# Patient Record
Sex: FEMALE | Race: WHITE | Employment: FULL TIME | URBAN - METROPOLITAN AREA
[De-identification: names, ages, dates, MRNs, and addresses within clinical notes are randomized per-mention and may not be internally consistent; named-entity substitution may affect disease eponyms.]

---

## 2021-06-24 LAB
CHOLESTEROL, TOTAL: 155 MG/DL
CHOLESTEROL/HDL RATIO: NORMAL
HDLC SERPL-MCNC: 67 MG/DL (ref 35–70)
LDL CHOLESTEROL CALCULATED: 70 MG/DL (ref 0–160)
NONHDLC SERPL-MCNC: NORMAL MG/DL
TRIGL SERPL-MCNC: 92 MG/DL
VLDLC SERPL CALC-MCNC: 18 MG/DL

## 2021-10-07 ENCOUNTER — APPOINTMENT (OUTPATIENT)
Dept: CT IMAGING | Age: 46
End: 2021-10-07
Payer: COMMERCIAL

## 2021-10-07 ENCOUNTER — HOSPITAL ENCOUNTER (EMERGENCY)
Age: 46
Discharge: HOME | End: 2021-10-07
Payer: COMMERCIAL

## 2021-10-07 ENCOUNTER — APPOINTMENT (OUTPATIENT)
Dept: GENERAL RADIOLOGY | Age: 46
End: 2021-10-07
Payer: COMMERCIAL

## 2021-10-07 ENCOUNTER — NURSE TRIAGE (OUTPATIENT)
Dept: OTHER | Facility: CLINIC | Age: 46
End: 2021-10-07

## 2021-10-07 VITALS
HEART RATE: 64 BPM | TEMPERATURE: 98 F | OXYGEN SATURATION: 96 % | HEIGHT: 63 IN | RESPIRATION RATE: 17 BRPM | BODY MASS INDEX: 37.74 KG/M2 | DIASTOLIC BLOOD PRESSURE: 81 MMHG | WEIGHT: 213 LBS | SYSTOLIC BLOOD PRESSURE: 132 MMHG

## 2021-10-07 DIAGNOSIS — S09.90XA CLOSED HEAD INJURY, INITIAL ENCOUNTER: ICD-10-CM

## 2021-10-07 DIAGNOSIS — M25.552 LEFT HIP PAIN: ICD-10-CM

## 2021-10-07 DIAGNOSIS — M25.512 ACUTE PAIN OF LEFT SHOULDER: ICD-10-CM
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
## 2021-10-07 PROCEDURE — 73502 X-RAY EXAM HIP UNI 2-3 VIEWS: CPT

## 2021-10-07 PROCEDURE — 73030 X-RAY EXAM OF SHOULDER: CPT

## 2021-10-07 PROCEDURE — 96372 THER/PROPH/DIAG INJ SC/IM: CPT

## 2021-10-07 PROCEDURE — 72125 CT NECK SPINE W/O DYE: CPT

## 2021-10-07 PROCEDURE — 70450 CT HEAD/BRAIN W/O DYE: CPT

## 2021-10-07 PROCEDURE — 6360000002 HC RX W HCPCS: Performed by: PHYSICIAN ASSISTANT

## 2021-10-07 PROCEDURE — 99282 EMERGENCY DEPT VISIT SF MDM: CPT

## 2021-10-07 RX ORDER — LISINOPRIL 20 MG/1
20 TABLET ORAL DAILY
COMMUNITY
End: 2022-03-09 | Stop reason: ALTCHOICE

## 2021-10-07 RX ORDER — MELOXICAM 7.5 MG/1
15 TABLET ORAL DAILY
Qty: 20 TABLET | Refills: 0 | Status: SHIPPED | OUTPATIENT
Start: 2021-10-07 | End: 2022-01-05 | Stop reason: ALTCHOICE

## 2021-10-07 RX ORDER — KETOROLAC TROMETHAMINE 30 MG/ML
15 INJECTION, SOLUTION INTRAMUSCULAR; INTRAVENOUS ONCE
Status: COMPLETED | OUTPATIENT
Start: 2021-10-07 | End: 2021-10-07

## 2021-10-07 RX ORDER — MELOXICAM 7.5 MG/1
15 TABLET ORAL DAILY
Qty: 20 TABLET | Refills: 0 | Status: SHIPPED | OUTPATIENT
Start: 2021-10-07 | End: 2021-10-07 | Stop reason: SDUPTHER

## 2021-10-07 RX ORDER — PREGABALIN 150 MG/1
300 CAPSULE ORAL 2 TIMES DAILY
COMMUNITY
End: 2022-05-18

## 2021-10-07 RX ORDER — METHOCARBAMOL 500 MG/1
500 TABLET, FILM COATED ORAL 4 TIMES DAILY
Qty: 40 TABLET | Refills: 0 | Status: SHIPPED | OUTPATIENT
Start: 2021-10-07 | End: 2021-10-17

## 2021-10-07 RX ORDER — SIMVASTATIN 20 MG
20 TABLET ORAL NIGHTLY
COMMUNITY
End: 2022-04-25 | Stop reason: SDUPTHER

## 2021-10-07 RX ORDER — OMEPRAZOLE 20 MG/1
40 CAPSULE, DELAYED RELEASE ORAL NIGHTLY
COMMUNITY
End: 2022-03-04 | Stop reason: SDUPTHER

## 2021-10-07 RX ORDER — TOPIRAMATE 100 MG/1
100 TABLET, FILM COATED ORAL DAILY
COMMUNITY
End: 2022-03-04

## 2021-10-07 RX ORDER — SERTRALINE HYDROCHLORIDE 100 MG/1
150 TABLET, FILM COATED ORAL DAILY
COMMUNITY
End: 2022-04-25 | Stop reason: SDUPTHER

## 2021-10-07 RX ORDER — LEVOTHYROXINE SODIUM 0.15 MG/1
150 TABLET ORAL DAILY
COMMUNITY
End: 2022-04-25 | Stop reason: SDUPTHER

## 2021-10-07 RX ORDER — ASPIRIN 81 MG/1
81 TABLET ORAL DAILY
COMMUNITY

## 2021-10-07 RX ORDER — FAMOTIDINE 20 MG/1
20 TABLET, FILM COATED ORAL DAILY
COMMUNITY
End: 2022-03-04 | Stop reason: SDUPTHER

## 2021-10-07 RX ORDER — METHOCARBAMOL 500 MG/1
500 TABLET, FILM COATED ORAL 4 TIMES DAILY
Qty: 40 TABLET | Refills: 0 | Status: SHIPPED | OUTPATIENT
Start: 2021-10-07 | End: 2021-10-07 | Stop reason: SDUPTHER

## 2021-10-07 RX ORDER — PROMETHAZINE HYDROCHLORIDE 25 MG/1
25 TABLET ORAL EVERY 6 HOURS PRN
COMMUNITY
End: 2022-03-04 | Stop reason: SDUPTHER

## 2021-10-07 RX ADMIN — KETOROLAC TROMETHAMINE 15 MG: 30 INJECTION, SOLUTION INTRAMUSCULAR; INTRAVENOUS at 18:53

## 2021-10-07 ASSESSMENT — PAIN DESCRIPTION - PAIN TYPE: TYPE: ACUTE PAIN

## 2021-10-07 ASSESSMENT — PAIN DESCRIPTION - ORIENTATION: ORIENTATION: LEFT

## 2021-10-07 ASSESSMENT — PAIN SCALES - GENERAL
PAINLEVEL_OUTOF10: 6
PAINLEVEL_OUTOF10: 6

## 2021-10-07 ASSESSMENT — PAIN DESCRIPTION - LOCATION: LOCATION: HIP;SHOULDER

## 2021-10-07 NOTE — TELEPHONE ENCOUNTER
Patient's location of employment: [employer] - happened in courtyard  Location of injury: Back of head, left shoulder, and left hip  Time of injury: 10/7/21 11:30am   Last 4 of patient's SSN: [id number]  Location recommended for treatment: Patient has been seen in the ED already      Patient took lunch outside to eat and when she sat down at the metal table, the attached chair was removed and entire table flipped on top of her and pinned her underneath. Hit back of head, left shoulder and left hip. Current pain level is 5/10   No open skin or lumps on head. Bruising on both shoulder and hip     Associate went to ED for eval and treatment. Limit activity and was put off work until 10/18/21     Reason for Disposition   Caller has already spoken with another triager and has no further questions.     Protocols used: NO CONTACT OR DUPLICATE CONTACT [facility]

## 2021-10-07 NOTE — DISCHARGE INSTR - COC
Elimination:  Continence:   · Bowel: {YES / YR:65497}  · Bladder: {YES / BJ:46390}  Urinary Catheter: {Urinary Catheter:295648824}   Colostomy/Ileostomy/Ileal Conduit: {YES / DI:87937}       Date of Last BM: ***  No intake or output data in the 24 hours ending 10/07/21 1843  No intake/output data recorded.     Safety Concerns:     [facility] Safety Concerns:623708542}    Impairments/Disabilities:      [facility] Impairments/Disabilities:517950067}    Nutrition Therapy:  Current Nutrition Therapy:   [facility] Diet List:056690675}    Routes of Feeding: {CHP DME Other Feedings:969722649}  Liquids: {Slp liquid thickness:72414}  Daily Fluid Restriction: {CHP DME Yes amt example:932401048}  Last Modified Barium Swallow with Video (Video Swallowing Test): {Done Not Done VCIO:091961149}    Treatments at the Time of Hospital Discharge:   Respiratory Treatments: ***  Oxygen Therapy:  {Therapy; copd oxygen:71128}  Ventilator:    {[facility] Vent ZYNR:567116059}    Rehab Therapies: {THERAPEUTIC INTERVENTION:6436267406}  Weight Bearing Status/Restrictions: [facility] Weight Bearin}  Other Medical Equipment (for information only, NOT a DME order):  {EQUIPMENT:297048084}  Other Treatments: ***    Patient's personal belongings (please select all that are sent with patient):  {[facility] DME Belongings:758740822}    RN SIGNATURE:  {Esignature:666369585}    CASE MANAGEMENT/SOCIAL WORK SECTION    Inpatient Status Date: ***    Readmission Risk Assessment Score:  Readmission Risk              Risk of Unplanned Readmission:  0           Discharging to Facility/ Agency   · Name:   · Address:  · Phone:  · Fax:    Dialysis Facility (if applicable)   · Name:  · Address:  · Dialysis Schedule:  · Phone:  · Fax:    / signature: {Esignature:542116091}    PHYSICIAN SECTION    Prognosis: {Prognosis:9388762884}    Condition at Discharge: [facility] Patient Condition:033819663}    Rehab Potential (if transferring to Rehab): {Prognosis:4850510829}    Recommended Labs or Other Treatments After Discharge: ***    Physician Certification: I certify the above information and transfer of Yvon Krause  is necessary for the continuing treatment of the diagnosis listed and that she requires {Admit to Appropriate Level of Care:80998} for {GREATER/LESS:738798660} 30 days.      Update Admission H&P: {[facility]:744334218}    PHYSICIAN SIGNATURE:  {Esignature:175760834}

## 2021-10-07 NOTE — ED NOTES
Discharge instructions given. Pt verbalized understanding. Pt ambulated to waiting room.         Leopold Mettle, RN  10/07/21 8437

## 2021-10-07 NOTE — ED TRIAGE NOTES
Pt arrived to the Ed with complaints of left hip pain and left shoulder pain after a cafeteria table fell on top of her. Pt states she also hit her head. NO LOC.

## 2021-10-07 NOTE — Clinical Note
Carie Oneal was seen and treated in our emergency department on 10/7/2021. She may return to work on 10/18/2021. May return back to work without restriction. If symptoms linger will need orthopedic/occupational health for long-term management and restrictions. If you have any questions or concerns, please don't hesitate to call.       Sherri Delong 411, PA

## 2021-11-08 LAB
AVERAGE GLUCOSE: NORMAL
HBA1C MFR BLD: 7.7 %

## 2021-12-27 ENCOUNTER — HOSPITAL ENCOUNTER (INPATIENT)
Age: 46
LOS: 1 days | Discharge: HOME | DRG: 660 | End: 2021-12-30
Attending: EMERGENCY MEDICINE | Admitting: INTERNAL MEDICINE
Payer: COMMERCIAL

## 2021-12-27 ENCOUNTER — APPOINTMENT (OUTPATIENT)
Dept: CT IMAGING | Age: 46
DRG: 660 | End: 2021-12-27
Payer: COMMERCIAL

## 2021-12-27 DIAGNOSIS — N20.0 RENAL CALCULI: ICD-10-CM

## 2021-12-27 DIAGNOSIS — N10 ACUTE PYELONEPHRITIS: ICD-10-CM

## 2021-12-27 DIAGNOSIS — N20.1 URETEROLITHIASIS: Primary | ICD-10-CM

## 2021-12-27 PROBLEM — N12 PYELONEPHRITIS: Status: ACTIVE | Noted: 2021-12-27

## 2021-12-27 LAB
ANION GAP SERPL CALCULATED.3IONS-SCNC: 14 MMOL/L (ref 4–16)
BACTERIA: ABNORMAL /HPF
BASOPHILS ABSOLUTE: 0.1 K/CU MM
BASOPHILS RELATIVE PERCENT: 1.3 % (ref 0–1)
BILIRUBIN URINE: NEGATIVE MG/DL
BLOOD, URINE: NEGATIVE
BUN BLDV-MCNC: 10 MG/DL (ref 6–23)
CALCIUM SERPL-MCNC: 9 MG/DL (ref 8.3–10.6)
CAST TYPE: ABNORMAL /HPF
CHLORIDE BLD-SCNC: 108 MMOL/L (ref 99–110)
CLARITY: ABNORMAL
CO2: 21 MMOL/L (ref 21–32)
COLOR: ABNORMAL
COMMENT UA: ABNORMAL
CREAT SERPL-MCNC: 0.6 MG/DL (ref 0.6–1.1)
CRYSTAL TYPE: ABNORMAL /HPF
DIFFERENTIAL TYPE: ABNORMAL
EOSINOPHILS ABSOLUTE: 0.1 K/CU MM
EOSINOPHILS RELATIVE PERCENT: 1.3 % (ref 0–3)
EPITHELIAL CELLS, UA: 4 /HPF
GFR AFRICAN AMERICAN: >60 ML/MIN/1.73M2
GFR NON-AFRICAN AMERICAN: >60 ML/MIN/1.73M2
GLUCOSE BLD-MCNC: 121 MG/DL (ref 70–99)
GLUCOSE, URINE: NEGATIVE MG/DL
HCT VFR BLD CALC: 39.7 % (ref 37–47)
HEMOGLOBIN: 12.6 GM/DL (ref 12.5–16)
IMMATURE NEUTROPHIL %: 0.3 % (ref 0–0.43)
KETONES, URINE: ABNORMAL MG/DL
LEUKOCYTE ESTERASE, URINE: ABNORMAL
LYMPHOCYTES ABSOLUTE: 1.9 K/CU MM
LYMPHOCYTES RELATIVE PERCENT: 26.9 % (ref 24–44)
MCH RBC QN AUTO: 29.6 PG (ref 27–31)
MCHC RBC AUTO-ENTMCNC: 31.7 % (ref 32–36)
MCV RBC AUTO: 93.2 FL (ref 78–100)
MONOCYTES ABSOLUTE: 0.5 K/CU MM
MONOCYTES RELATIVE PERCENT: 7.2 % (ref 0–4)
NITRITE URINE, QUANTITATIVE: NEGATIVE
PDW BLD-RTO: 12.9 % (ref 11.7–14.9)
PH, URINE: 5 (ref 5–8)
PLATELET # BLD: 286 K/CU MM (ref 140–440)
PMV BLD AUTO: 12.2 FL (ref 7.5–11.1)
POTASSIUM SERPL-SCNC: 3.6 MMOL/L (ref 3.5–5.1)
PROTEIN UA: 100 MG/DL
RBC # BLD: 4.26 M/CU MM (ref 4.2–5.4)
RBC URINE: 1 /HPF (ref 0–6)
SARS-COV-2, NAAT: NOT DETECTED
SEGMENTED NEUTROPHILS ABSOLUTE COUNT: 4.4 K/CU MM
SEGMENTED NEUTROPHILS RELATIVE PERCENT: 63 % (ref 36–66)
SODIUM BLD-SCNC: 143 MMOL/L (ref 135–145)
SOURCE: NORMAL
SPECIFIC GRAVITY UA: 1.01 (ref 1–1.03)
TOTAL IMMATURE NEUTOROPHIL: 0.02 K/CU MM
UROBILINOGEN, URINE: 1 MG/DL (ref 0.2–1)
WBC # BLD: 7 K/CU MM (ref 4–10.5)
WBC UA: 30 /HPF (ref 0–5)

## 2021-12-27 PROCEDURE — 96376 TX/PRO/DX INJ SAME DRUG ADON: CPT

## 2021-12-27 PROCEDURE — 96361 HYDRATE IV INFUSION ADD-ON: CPT

## 2021-12-27 PROCEDURE — 99284 EMERGENCY DEPT VISIT MOD MDM: CPT

## 2021-12-27 PROCEDURE — 80048 BASIC METABOLIC PNL TOTAL CA: CPT

## 2021-12-27 PROCEDURE — 96365 THER/PROPH/DIAG IV INF INIT: CPT

## 2021-12-27 PROCEDURE — 6360000002 HC RX W HCPCS: Performed by: EMERGENCY MEDICINE

## 2021-12-27 PROCEDURE — 6370000000 HC RX 637 (ALT 250 FOR IP): Performed by: EMERGENCY MEDICINE

## 2021-12-27 PROCEDURE — 74176 CT ABD & PELVIS W/O CONTRAST: CPT

## 2021-12-27 PROCEDURE — 2580000003 HC RX 258: Performed by: EMERGENCY MEDICINE

## 2021-12-27 PROCEDURE — 85025 COMPLETE CBC W/AUTO DIFF WBC: CPT

## 2021-12-27 PROCEDURE — 81001 URINALYSIS AUTO W/SCOPE: CPT

## 2021-12-27 PROCEDURE — 87086 URINE CULTURE/COLONY COUNT: CPT

## 2021-12-27 PROCEDURE — 96375 TX/PRO/DX INJ NEW DRUG ADDON: CPT

## 2021-12-27 PROCEDURE — 87635 SARS-COV-2 COVID-19 AMP PRB: CPT

## 2021-12-27 PROCEDURE — 96372 THER/PROPH/DIAG INJ SC/IM: CPT

## 2021-12-27 RX ORDER — MORPHINE SULFATE 4 MG/ML
4 INJECTION, SOLUTION INTRAMUSCULAR; INTRAVENOUS ONCE
Status: COMPLETED | OUTPATIENT
Start: 2021-12-27 | End: 2021-12-27

## 2021-12-27 RX ORDER — SODIUM CHLORIDE 9 MG/ML
INJECTION, SOLUTION INTRAVENOUS CONTINUOUS
Status: DISCONTINUED | OUTPATIENT
Start: 2021-12-27 | End: 2021-12-28

## 2021-12-27 RX ORDER — 0.9 % SODIUM CHLORIDE 0.9 %
1000 INTRAVENOUS SOLUTION INTRAVENOUS ONCE
Status: COMPLETED | OUTPATIENT
Start: 2021-12-27 | End: 2021-12-27

## 2021-12-27 RX ORDER — ONDANSETRON 2 MG/ML
4 INJECTION INTRAMUSCULAR; INTRAVENOUS EVERY 6 HOURS PRN
Status: DISCONTINUED | OUTPATIENT
Start: 2021-12-27 | End: 2021-12-28 | Stop reason: SDUPTHER

## 2021-12-27 RX ORDER — MORPHINE SULFATE 4 MG/ML
4 INJECTION, SOLUTION INTRAMUSCULAR; INTRAVENOUS
Status: DISCONTINUED | OUTPATIENT
Start: 2021-12-27 | End: 2021-12-28

## 2021-12-27 RX ORDER — ONDANSETRON 4 MG/1
8 TABLET, ORALLY DISINTEGRATING ORAL ONCE
Status: COMPLETED | OUTPATIENT
Start: 2021-12-27 | End: 2021-12-27

## 2021-12-27 RX ORDER — KETOROLAC TROMETHAMINE 30 MG/ML
30 INJECTION, SOLUTION INTRAMUSCULAR; INTRAVENOUS ONCE
Status: COMPLETED | OUTPATIENT
Start: 2021-12-27 | End: 2021-12-27

## 2021-12-27 RX ADMIN — KETOROLAC TROMETHAMINE 30 MG: 30 INJECTION, SOLUTION INTRAMUSCULAR; INTRAVENOUS at 15:31

## 2021-12-27 RX ADMIN — CEFTRIAXONE SODIUM 1000 MG: 1 INJECTION, POWDER, FOR SOLUTION INTRAMUSCULAR; INTRAVENOUS at 17:11

## 2021-12-27 RX ADMIN — ONDANSETRON 4 MG: 2 INJECTION INTRAMUSCULAR; INTRAVENOUS at 20:50

## 2021-12-27 RX ADMIN — ONDANSETRON 8 MG: 4 TABLET, ORALLY DISINTEGRATING ORAL at 15:32

## 2021-12-27 RX ADMIN — MORPHINE SULFATE 4 MG: 4 INJECTION INTRAVENOUS at 20:50

## 2021-12-27 RX ADMIN — MORPHINE SULFATE 4 MG: 4 INJECTION INTRAVENOUS at 17:29

## 2021-12-27 RX ADMIN — SODIUM CHLORIDE 1000 ML: 9 INJECTION, SOLUTION INTRAVENOUS at 17:07

## 2021-12-27 RX ADMIN — SODIUM CHLORIDE: 9 INJECTION, SOLUTION INTRAVENOUS at 20:50

## 2021-12-27 ASSESSMENT — PAIN DESCRIPTION - LOCATION: LOCATION: BACK;FLANK;PELVIS

## 2021-12-27 ASSESSMENT — PAIN DESCRIPTION - FREQUENCY: FREQUENCY: CONTINUOUS

## 2021-12-27 ASSESSMENT — PAIN SCALES - GENERAL
PAINLEVEL_OUTOF10: 9
PAINLEVEL_OUTOF10: 6
PAINLEVEL_OUTOF10: 7

## 2021-12-27 ASSESSMENT — PAIN DESCRIPTION - ONSET: ONSET: PROGRESSIVE

## 2021-12-27 ASSESSMENT — PAIN DESCRIPTION - ORIENTATION: ORIENTATION: RIGHT;LEFT;LOWER

## 2021-12-27 ASSESSMENT — PAIN DESCRIPTION - DESCRIPTORS: DESCRIPTORS: BURNING;ACHING

## 2021-12-27 ASSESSMENT — PAIN DESCRIPTION - PAIN TYPE: TYPE: ACUTE PAIN

## 2021-12-27 NOTE — LETTER
[facility] [address]  [address] [address]  Phone: [phone]    No name on file. December 30, 2021     Patient: Severa Bonito   YOB: 1975   Date of Visit: 12/27/2021       To Whom It May Concern: It is my medical opinion that Severa Bonito may return to work 1/4/2022. If you have any questions or concerns, please don't hesitate to call.     Sincerely,        Peyman Remy RN

## 2021-12-27 NOTE — ED TRIAGE NOTES
Pt to the ED for c/o bilateral flank pain and lower back pain with dysuria and hematuria x2 days. Pt reports hx of kidney stones.

## 2021-12-27 NOTE — ED PROVIDER NOTES
Triage Chief Complaint:   Flank Pain (Pt to the ED for c/o bilateral flank pain with dysuria and hematuria x2 days. Pt reports hx of kidney stones. ) and Dysuria    [place]:  Cynthia Santiago is a 48 y.o. female that presents with concern for kidney stone/urinary tract infection. The patient states that over the past 2 days she has had dysuria, increasing frequency and some blood in her urine. States that she intermittently has sharp bilateral flank pain but fairly constant cramping suprapubic and bilateral abdominal pain. Denies any alleviating or exacerbating factors. She has been taking ibuprofen and Pyridium. States that she thinks she may have had a fever yesterday. She has associated nausea. Denies any vomiting or diarrhea. States that she has a history of kidney stones and that this feels similar. ROS:  At least 10 systems reviewed and otherwise acutely negative except as in the [address] Past Medical History:   Diagnosis Date    Depression     Diabetes mellitus ([address]     GERD (gastroesophageal reflux disease)     Hypertension     Thyroid disease      Past Surgical History:   Procedure Laterality Date    APPENDECTOMY  1989    CHOLECYSTECTOMY  1995    GASTRIC BYPASS SURGERY  2014    HYSTERECTOMY, TOTAL ABDOMINAL  2017    KIDNEY STONE SURGERY  2017    SPINAL CORD STIMULATOR SURGERY  2017    TONSILLECTOMY  1997     History reviewed. No pertinent family history.   Social History     Socioeconomic History    Marital status: Single     Spouse name: Not on file    Number of children: Not on file    Years of education: Not on file    Highest education level: Not on file   Occupational History    Not on file   Tobacco Use    Smoking status: Never Smoker    Smokeless tobacco: Never Used   Vaping Use    Vaping Use: Never used   Substance and Sexual Activity    Alcohol use: Not Currently     Comment: occ    Drug use: Never    Sexual activity: Not on file   Other Topics Concern    Not on file Social History Narrative    Not on file     Social Determinants of Health     Financial Resource Strain:     Difficulty of Paying Living Expenses: Not on file   Food Insecurity:     Worried About Running Out of Food in the Last Year: Not on file    Nena of Food in the Last Year: Not on file   Transportation Needs:     Lack of Transportation (Medical): Not on file    Lack of Transportation (Non-Medical):  Not on file   Physical Activity:     Days of Exercise per Week: Not on file    Minutes of Exercise per Session: Not on file   Stress:     Feeling of Stress : Not on file   Social Connections:     Frequency of Communication with Friends and Family: Not on file    Frequency of Social Gatherings with Friends and Family: Not on file    Attends Hindu Services: Not on file    Active Member of [address] [place] [address] SMB [employer] or Organizations: Not on file    Attends Club or Organization Meetings: Not on file    Marital Status: Not on file   Intimate Partner Violence:     Fear of Current or Ex-Partner: Not on file    Emotionally Abused: Not on file    Physically Abused: Not on file    Sexually Abused: Not on file   Housing Stability:     Unable to Pay for Housing in the Last Year: Not on file    Number of [place] in the Last Year: Not on file    Unstable Housing in the Last Year: Not on file     Current Facility-Administered Medications   Medication Dose Route Frequency Provider Last Rate Last Admin    cefTRIAXone (ROCEPHIN) 1000 mg IVPB in 50 mL D5W minibag  1,000 mg IntraVENous Once Catrina Halsted,  mL/hr at 12/27/21 1711 1,000 mg at 12/27/21 1711    0.9 % sodium chloride bolus  1,000 mL IntraVENous Once Catrina Halsted, MD 1,000 mL/hr at 12/27/21 1707 1,000 mL at 12/27/21 1707    morphine sulfate (PF) injection 4 mg  4 mg IntraVENous Once Catrina Halsted, MD         Current Outpatient Medications   Medication Sig Dispense Refill    levothyroxine (SYNTHROID) 150 MCG tablet Take 150 mcg by mouth Daily      lisinopril (PRINIVIL;ZESTRIL) 20 MG tablet Take 20 mg by mouth daily      pregabalin (LYRICA) 150 MG capsule Take 300 mg by mouth 2 times daily.  simvastatin (ZOCOR) 20 MG tablet Take 20 mg by mouth nightly      aspirin 81 MG EC tablet Take 81 mg by mouth daily      topiramate (TOPAMAX) 100 MG tablet Take 100 mg by mouth daily      omeprazole (PRILOSEC) 20 MG delayed release capsule Take 40 mg by mouth nightly      famotidine (PEPCID) 20 MG tablet Take 20 mg by mouth daily      Insulin Pump - Insulin regular Inject into the skin continuous Insulin-to-Carb Ratio (ICR): Insulin Sensitivity Factor (ISF):   Target Blood Glucose: Bolus Frequency:      sertraline (ZOLOFT) 100 MG tablet Take 150 mg by mouth daily      promethazine (PHENERGAN) 25 MG tablet Take 25 mg by mouth every 6 hours as needed for Nausea      meloxicam (MOBIC) 7.5 MG tablet Take 2 tablets by mouth daily for 10 days 20 tablet 0     Allergies   Allergen Reactions    Dilaudid [Hydromorphone Hcl] Rash    Metformin And Related Nausea And Vomiting       Nursing Notes Reviewed    Physical Exam:  ED Triage Vitals [12/27/21 1350]   Enc Vitals Group      /83      Pulse 89      Resp 18      Temp 97.1 °F (36.2 °C)      Temp Source Infrared      SpO2 95 %      Weight 195 lb (88.5 kg)      Height 5' 3\" (1.6 m)      Head Circumference       Peak Flow       Pain Score       Pain Loc       Pain Edu? Excl. in [address]? GENERAL APPEARANCE: Awake and alert. Cooperative. No acute distress. HEAD: Normocephalic. Atraumatic. EYES: EOM's grossly intact. Sclera anicteric. ENT: Mucous membranes are moist. Tolerates saliva. No trismus. NECK: Supple. No meningismus. Trachea midline. HEART: RRR. Radial pulses 2+. LUNGS: Respirations unlabored. CTAB  ABDOMEN: Soft. Non-tender. No guarding or rebound. Bilateral CVA tenderness  EXTREMITIES: No acute deformities. SKIN: Warm and dry. NEUROLOGICAL: No gross facial drooping.  Moves all 4 extremities spontaneously. PSYCHIATRIC: Normal mood.     I have reviewed and interpreted all of the currently available lab results from this visit (if applicable):  Results for orders placed or performed during the hospital encounter of 12/27/21   CBC Auto Differential   Result Value Ref Range    WBC 7.0 4.0 - 10.5 K/CU MM    RBC 4.26 4.2 - 5.4 M/CU MM    Hemoglobin 12.6 12.5 - 16.0 GM/DL    Hematocrit 39.7 37 - 47 %    MCV 93.2 78 - 100 FL    MCH 29.6 27 - 31 PG    MCHC 31.7 (L) 32.0 - 36.0 %    RDW 12.9 11.7 - 14.9 %    Platelets 739 437 - 929 K/CU MM    MPV 12.2 (H) 7.5 - 11.1 FL    Differential Type AUTOMATED DIFFERENTIAL     Segs Relative 63.0 36 - 66 %    Lymphocytes % 26.9 24 - 44 %    Monocytes % 7.2 (H) 0 - 4 %    Eosinophils % 1.3 0 - 3 %    Basophils % 1.3 (H) 0 - 1 %    Segs Absolute 4.4 K/CU MM    Lymphocytes Absolute 1.9 K/CU MM    Monocytes Absolute 0.5 K/CU MM    Eosinophils Absolute 0.1 K/CU MM    Basophils Absolute 0.1 K/CU MM    Immature Neutrophil % 0.3 0 - 0.43 %    Total Immature Neutrophil 0.02 K/CU MM   BMP   Result Value Ref Range    Sodium 143 135 - 145 MMOL/L    Potassium 3.6 3.5 - 5.1 MMOL/L    Chloride 108 99 - 110 mMol/L    CO2 21 21 - 32 MMOL/L    Anion Gap 14 4 - 16    BUN 10 6 - 23 MG/DL    CREATININE 0.6 0.6 - 1.1 MG/DL    Glucose 121 (H) 70 - 99 MG/DL    Calcium 9.0 8.3 - 10.6 MG/DL    GFR Non-African American >60 >60 mL/min/1.73m2    GFR African American >60 >60 mL/min/1.73m2   Urinalysis with Microscopic   Result Value Ref Range    Color, UA CALEB (A) YELLOW    Clarity, UA CLOUDY (A) CLEAR    Glucose, Urine NEGATIVE NEGATIVE MG/DL    Bilirubin Urine NEGATIVE NEGATIVE MG/DL    Ketones, Urine MODERATE (A) NEGATIVE MG/DL    Specific Gravity, UA 1.015 1.001 - 1.035    Blood, Urine NEGATIVE NEGATIVE    pH, Urine 5.0 5.0 - 8.0    Protein,  (A) NEGATIVE MG/DL    Urobilinogen, Urine 1 0.2 - 1.0 MG/DL    Nitrite Urine, Quantitative NEGATIVE NEGATIVE    Leukocyte Esterase, Urine LARGE (A) NEGATIVE    RBC, UA 1 0 - 6 /HPF    WBC, UA 30 (H) 0 - 5 /HPF    Epithelial Cells, UA 4 /HPF    Cast Type  RARE HYALINE CAST SEEN NO CAST FORMS SEEN /HPF    Bacteria, UA OCCASIONAL (A) NEGATIVE /HPF    Crystal Type  OCCASIONAL CALCIUM OXALATE CRYSTALS NEGATIVE /HPF    Urinalysis Comments  3+ MUCUS SEEN       Radiographs (if obtained):  [] The following radiograph was interpreted by myself in the absence of a radiologist:  [x] Radiologist's Report Reviewed:    EKG (if obtained): (All EKG's are interpreted by myself in the absence of a cardiologist)    MDM:  Plan of care is discussed thoroughly with the patient and family if present. If performed, all imaging and lab work also discussed with patient. All relevant prior results and chart reviewed if available. Patient presents as above. She is in no acute distress. Vital signs are normal.  She presents with urinary symptoms, hematuria and bilateral flank pain. CT ordered for further evaluation. She is given Toradol and Zofran here. Patient's metabolic work-up is largely unremarkable. She does not have a leukocytosis. Urinalysis is concerning for possible UTI. CT is showing left UVJ stone measuring 9 mm with hydronephrosis. Patient is started on Rocephin. Patient is additionally given morphine for pain control. I spoke with Dr. Osvaldo Liz who agrees to transfer to [facility] for further evaluation and care. No indication at this time for emergent stone retrieval.  Patient agreeable with this plan of care. Clinical Impression:  1. Ureterolithiasis    2.  Acute pyelonephritis      (Please note that portions of this note may have been completed with a voice recognition program. Efforts were made to edit the dictations but occasionally words are mis-transcribed.)    MD Young Marte MD  12/27/21 6936

## 2021-12-28 ENCOUNTER — ANESTHESIA EVENT (OUTPATIENT)
Dept: OPERATING ROOM | Age: 46
DRG: 660 | End: 2021-12-28
Payer: COMMERCIAL

## 2021-12-28 ENCOUNTER — ANESTHESIA (OUTPATIENT)
Dept: OPERATING ROOM | Age: 46
DRG: 660 | End: 2021-12-28
Payer: COMMERCIAL

## 2021-12-28 ENCOUNTER — APPOINTMENT (OUTPATIENT)
Dept: GENERAL RADIOLOGY | Age: 46
DRG: 660 | End: 2021-12-28
Payer: COMMERCIAL

## 2021-12-28 VITALS
SYSTOLIC BLOOD PRESSURE: 93 MMHG | RESPIRATION RATE: 2 BRPM | OXYGEN SATURATION: 99 % | DIASTOLIC BLOOD PRESSURE: 70 MMHG | TEMPERATURE: 97.7 F

## 2021-12-28 PROBLEM — N20.0 RENAL CALCULI: Status: ACTIVE | Noted: 2021-12-28

## 2021-12-28 LAB
GLUCOSE BLD-MCNC: 101 MG/DL (ref 70–99)
GLUCOSE BLD-MCNC: 103 MG/DL (ref 70–99)
GLUCOSE BLD-MCNC: 107 MG/DL (ref 70–99)
GLUCOSE BLD-MCNC: 123 MG/DL (ref 70–99)
GLUCOSE BLD-MCNC: 98 MG/DL (ref 70–99)

## 2021-12-28 PROCEDURE — 6370000000 HC RX 637 (ALT 250 FOR IP): Performed by: INTERNAL MEDICINE

## 2021-12-28 PROCEDURE — 6360000002 HC RX W HCPCS: Performed by: EMERGENCY MEDICINE

## 2021-12-28 PROCEDURE — 6370000000 HC RX 637 (ALT 250 FOR IP): Performed by: UROLOGY

## 2021-12-28 PROCEDURE — G0378 HOSPITAL OBSERVATION PER HR: HCPCS

## 2021-12-28 PROCEDURE — 82962 GLUCOSE BLOOD TEST: CPT

## 2021-12-28 PROCEDURE — 6370000000 HC RX 637 (ALT 250 FOR IP): Performed by: NURSE PRACTITIONER

## 2021-12-28 PROCEDURE — 96376 TX/PRO/DX INJ SAME DRUG ADON: CPT

## 2021-12-28 PROCEDURE — 6360000002 HC RX W HCPCS: Performed by: NURSE PRACTITIONER

## 2021-12-28 PROCEDURE — 6360000002 HC RX W HCPCS: Performed by: INTERNAL MEDICINE

## 2021-12-28 PROCEDURE — 3600000013 HC SURGERY LEVEL 3 ADDTL 15MIN: Performed by: UROLOGY

## 2021-12-28 PROCEDURE — 7100000000 HC PACU RECOVERY - FIRST 15 MIN: Performed by: UROLOGY

## 2021-12-28 PROCEDURE — 3700000001 HC ADD 15 MINUTES (ANESTHESIA): Performed by: UROLOGY

## 2021-12-28 PROCEDURE — 7100000001 HC PACU RECOVERY - ADDTL 15 MIN: Performed by: UROLOGY

## 2021-12-28 PROCEDURE — 3600000003 HC SURGERY LEVEL 3 BASE: Performed by: UROLOGY

## 2021-12-28 PROCEDURE — 3700000000 HC ANESTHESIA ATTENDED CARE: Performed by: UROLOGY

## 2021-12-28 PROCEDURE — 0T778DZ DILATION OF LEFT URETER WITH INTRALUMINAL DEVICE, VIA NATURAL OR ARTIFICIAL OPENING ENDOSCOPIC: ICD-10-PCS | Performed by: UROLOGY

## 2021-12-28 PROCEDURE — C2617 STENT, NON-COR, TEM W/O DEL: HCPCS | Performed by: UROLOGY

## 2021-12-28 PROCEDURE — 6360000002 HC RX W HCPCS: Performed by: NURSE ANESTHETIST, CERTIFIED REGISTERED

## 2021-12-28 PROCEDURE — 76000 FLUOROSCOPY <1 HR PHYS/QHP: CPT

## 2021-12-28 PROCEDURE — C1769 GUIDE WIRE: HCPCS | Performed by: UROLOGY

## 2021-12-28 PROCEDURE — 94761 N-INVAS EAR/PLS OXIMETRY MLT: CPT

## 2021-12-28 PROCEDURE — 2580000003 HC RX 258: Performed by: INTERNAL MEDICINE

## 2021-12-28 PROCEDURE — 2580000003 HC RX 258: Performed by: UROLOGY

## 2021-12-28 DEVICE — VARIABLE LENGTH URETERAL STENT
Type: IMPLANTABLE DEVICE | Site: URETER | Status: FUNCTIONAL
Brand: CONTOUR VL™

## 2021-12-28 RX ORDER — SODIUM CHLORIDE 0.9 % (FLUSH) 0.9 %
5-40 SYRINGE (ML) INJECTION EVERY 12 HOURS SCHEDULED
Status: DISCONTINUED | OUTPATIENT
Start: 2021-12-28 | End: 2021-12-30 | Stop reason: HOSPADM

## 2021-12-28 RX ORDER — ONDANSETRON 2 MG/ML
4 INJECTION INTRAMUSCULAR; INTRAVENOUS
Status: DISCONTINUED | OUTPATIENT
Start: 2021-12-28 | End: 2021-12-28 | Stop reason: HOSPADM

## 2021-12-28 RX ORDER — DEXAMETHASONE SODIUM PHOSPHATE 4 MG/ML
INJECTION, SOLUTION INTRA-ARTICULAR; INTRALESIONAL; INTRAMUSCULAR; INTRAVENOUS; SOFT TISSUE PRN
Status: DISCONTINUED | OUTPATIENT
Start: 2021-12-28 | End: 2021-12-28 | Stop reason: SDUPTHER

## 2021-12-28 RX ORDER — LIDOCAINE HYDROCHLORIDE 20 MG/ML
INJECTION, SOLUTION INTRAVENOUS PRN
Status: DISCONTINUED | OUTPATIENT
Start: 2021-12-28 | End: 2021-12-28 | Stop reason: SDUPTHER

## 2021-12-28 RX ORDER — HYDRALAZINE HYDROCHLORIDE 20 MG/ML
5 INJECTION INTRAMUSCULAR; INTRAVENOUS EVERY 10 MIN PRN
Status: DISCONTINUED | OUTPATIENT
Start: 2021-12-28 | End: 2021-12-28 | Stop reason: HOSPADM

## 2021-12-28 RX ORDER — MORPHINE SULFATE 4 MG/ML
4 INJECTION, SOLUTION INTRAMUSCULAR; INTRAVENOUS
Status: DISCONTINUED | OUTPATIENT
Start: 2021-12-28 | End: 2021-12-28

## 2021-12-28 RX ORDER — ONDANSETRON 2 MG/ML
INJECTION INTRAMUSCULAR; INTRAVENOUS PRN
Status: DISCONTINUED | OUTPATIENT
Start: 2021-12-28 | End: 2021-12-28 | Stop reason: SDUPTHER

## 2021-12-28 RX ORDER — DEXTROSE MONOHYDRATE 25 G/50ML
12.5 INJECTION, SOLUTION INTRAVENOUS PRN
Status: DISCONTINUED | OUTPATIENT
Start: 2021-12-28 | End: 2021-12-30 | Stop reason: HOSPADM

## 2021-12-28 RX ORDER — ASPIRIN 81 MG/1
81 TABLET ORAL DAILY
Status: DISCONTINUED | OUTPATIENT
Start: 2021-12-28 | End: 2021-12-30 | Stop reason: HOSPADM

## 2021-12-28 RX ORDER — SODIUM CHLORIDE 9 MG/ML
INJECTION, SOLUTION INTRAVENOUS CONTINUOUS
Status: DISCONTINUED | OUTPATIENT
Start: 2021-12-28 | End: 2021-12-28

## 2021-12-28 RX ORDER — FAMOTIDINE 20 MG/1
20 TABLET, FILM COATED ORAL DAILY
Status: DISCONTINUED | OUTPATIENT
Start: 2021-12-28 | End: 2021-12-30 | Stop reason: HOSPADM

## 2021-12-28 RX ORDER — PANTOPRAZOLE SODIUM 40 MG/1
40 TABLET, DELAYED RELEASE ORAL NIGHTLY
Status: DISCONTINUED | OUTPATIENT
Start: 2021-12-28 | End: 2021-12-30 | Stop reason: HOSPADM

## 2021-12-28 RX ORDER — IBUPROFEN 400 MG/1
400 TABLET ORAL EVERY 6 HOURS PRN
Status: DISCONTINUED | OUTPATIENT
Start: 2021-12-28 | End: 2021-12-30 | Stop reason: HOSPADM

## 2021-12-28 RX ORDER — LEVOTHYROXINE SODIUM 0.15 MG/1
150 TABLET ORAL DAILY
Status: DISCONTINUED | OUTPATIENT
Start: 2021-12-28 | End: 2021-12-30 | Stop reason: HOSPADM

## 2021-12-28 RX ORDER — KETOROLAC TROMETHAMINE 30 MG/ML
15 INJECTION, SOLUTION INTRAMUSCULAR; INTRAVENOUS ONCE
Status: COMPLETED | OUTPATIENT
Start: 2021-12-28 | End: 2021-12-28

## 2021-12-28 RX ORDER — POLYETHYLENE GLYCOL 3350 17 G/17G
17 POWDER, FOR SOLUTION ORAL DAILY PRN
Status: DISCONTINUED | OUTPATIENT
Start: 2021-12-28 | End: 2021-12-30 | Stop reason: HOSPADM

## 2021-12-28 RX ORDER — SODIUM CHLORIDE 9 MG/ML
25 INJECTION, SOLUTION INTRAVENOUS PRN
Status: DISCONTINUED | OUTPATIENT
Start: 2021-12-28 | End: 2021-12-30 | Stop reason: HOSPADM

## 2021-12-28 RX ORDER — FENTANYL CITRATE 50 UG/ML
50 INJECTION, SOLUTION INTRAMUSCULAR; INTRAVENOUS EVERY 5 MIN PRN
Status: DISCONTINUED | OUTPATIENT
Start: 2021-12-28 | End: 2021-12-28 | Stop reason: HOSPADM

## 2021-12-28 RX ORDER — NICOTINE POLACRILEX 4 MG
15 LOZENGE BUCCAL PRN
Status: DISCONTINUED | OUTPATIENT
Start: 2021-12-28 | End: 2021-12-30 | Stop reason: HOSPADM

## 2021-12-28 RX ORDER — FENTANYL CITRATE 50 UG/ML
25 INJECTION, SOLUTION INTRAMUSCULAR; INTRAVENOUS EVERY 5 MIN PRN
Status: DISCONTINUED | OUTPATIENT
Start: 2021-12-28 | End: 2021-12-28 | Stop reason: HOSPADM

## 2021-12-28 RX ORDER — SODIUM CHLORIDE 9 MG/ML
INJECTION, SOLUTION INTRAVENOUS CONTINUOUS
Status: DISCONTINUED | OUTPATIENT
Start: 2021-12-28 | End: 2021-12-30 | Stop reason: HOSPADM

## 2021-12-28 RX ORDER — LABETALOL HYDROCHLORIDE 5 MG/ML
5 INJECTION, SOLUTION INTRAVENOUS EVERY 10 MIN PRN
Status: DISCONTINUED | OUTPATIENT
Start: 2021-12-28 | End: 2021-12-28 | Stop reason: HOSPADM

## 2021-12-28 RX ORDER — METOPROLOL SUCCINATE 25 MG/1
25 TABLET, EXTENDED RELEASE ORAL DAILY
COMMUNITY
End: 2022-03-09 | Stop reason: SDUPTHER

## 2021-12-28 RX ORDER — SODIUM CHLORIDE 0.9 % (FLUSH) 0.9 %
5-40 SYRINGE (ML) INJECTION PRN
Status: DISCONTINUED | OUTPATIENT
Start: 2021-12-28 | End: 2021-12-30 | Stop reason: HOSPADM

## 2021-12-28 RX ORDER — ONDANSETRON 2 MG/ML
4 INJECTION INTRAMUSCULAR; INTRAVENOUS EVERY 6 HOURS PRN
Status: DISCONTINUED | OUTPATIENT
Start: 2021-12-28 | End: 2021-12-30 | Stop reason: HOSPADM

## 2021-12-28 RX ORDER — PHENAZOPYRIDINE HYDROCHLORIDE 100 MG/1
200 TABLET, FILM COATED ORAL 3 TIMES DAILY PRN
Status: DISCONTINUED | OUTPATIENT
Start: 2021-12-28 | End: 2021-12-30 | Stop reason: HOSPADM

## 2021-12-28 RX ORDER — METOPROLOL SUCCINATE 25 MG/1
25 TABLET, EXTENDED RELEASE ORAL DAILY
Status: DISCONTINUED | OUTPATIENT
Start: 2021-12-28 | End: 2021-12-30 | Stop reason: HOSPADM

## 2021-12-28 RX ORDER — PROPOFOL 10 MG/ML
INJECTION, EMULSION INTRAVENOUS PRN
Status: DISCONTINUED | OUTPATIENT
Start: 2021-12-28 | End: 2021-12-28 | Stop reason: SDUPTHER

## 2021-12-28 RX ORDER — FENTANYL CITRATE 50 UG/ML
INJECTION, SOLUTION INTRAMUSCULAR; INTRAVENOUS PRN
Status: DISCONTINUED | OUTPATIENT
Start: 2021-12-28 | End: 2021-12-28 | Stop reason: SDUPTHER

## 2021-12-28 RX ORDER — LISINOPRIL 20 MG/1
20 TABLET ORAL DAILY
Status: DISCONTINUED | OUTPATIENT
Start: 2021-12-28 | End: 2021-12-30 | Stop reason: HOSPADM

## 2021-12-28 RX ORDER — TOPIRAMATE 100 MG/1
100 TABLET, FILM COATED ORAL NIGHTLY
Status: DISCONTINUED | OUTPATIENT
Start: 2021-12-28 | End: 2021-12-30 | Stop reason: HOSPADM

## 2021-12-28 RX ORDER — PREGABALIN 100 MG/1
300 CAPSULE ORAL 2 TIMES DAILY
Status: DISCONTINUED | OUTPATIENT
Start: 2021-12-28 | End: 2021-12-30 | Stop reason: HOSPADM

## 2021-12-28 RX ORDER — ONDANSETRON 4 MG/1
4 TABLET, ORALLY DISINTEGRATING ORAL EVERY 8 HOURS PRN
Status: DISCONTINUED | OUTPATIENT
Start: 2021-12-28 | End: 2021-12-30 | Stop reason: HOSPADM

## 2021-12-28 RX ORDER — DEXTROSE MONOHYDRATE 50 MG/ML
100 INJECTION, SOLUTION INTRAVENOUS PRN
Status: DISCONTINUED | OUTPATIENT
Start: 2021-12-28 | End: 2021-12-30 | Stop reason: HOSPADM

## 2021-12-28 RX ORDER — TRAMADOL HYDROCHLORIDE 50 MG/1
50 TABLET ORAL EVERY 4 HOURS PRN
Status: DISCONTINUED | OUTPATIENT
Start: 2021-12-28 | End: 2021-12-30 | Stop reason: HOSPADM

## 2021-12-28 RX ORDER — ATORVASTATIN CALCIUM 10 MG/1
10 TABLET, FILM COATED ORAL DAILY
Status: DISCONTINUED | OUTPATIENT
Start: 2021-12-28 | End: 2021-12-30 | Stop reason: HOSPADM

## 2021-12-28 RX ORDER — ACETAMINOPHEN 325 MG/1
650 TABLET ORAL EVERY 6 HOURS PRN
Status: DISCONTINUED | OUTPATIENT
Start: 2021-12-28 | End: 2021-12-30 | Stop reason: HOSPADM

## 2021-12-28 RX ADMIN — SODIUM CHLORIDE: 9 INJECTION, SOLUTION INTRAVENOUS at 17:12

## 2021-12-28 RX ADMIN — PROPOFOL 160 MG: 10 INJECTION, EMULSION INTRAVENOUS at 16:18

## 2021-12-28 RX ADMIN — LIDOCAINE HYDROCHLORIDE: 20 SOLUTION ORAL; TOPICAL at 12:14

## 2021-12-28 RX ADMIN — MORPHINE SULFATE 4 MG: 4 INJECTION INTRAVENOUS at 00:30

## 2021-12-28 RX ADMIN — FENTANYL CITRATE 25 MCG: 50 INJECTION, SOLUTION INTRAMUSCULAR; INTRAVENOUS at 16:16

## 2021-12-28 RX ADMIN — DEXAMETHASONE SODIUM PHOSPHATE 4 MG: 4 INJECTION INTRA-ARTICULAR; INTRALESIONAL; INTRAMUSCULAR; INTRAVENOUS; SOFT TISSUE at 16:25

## 2021-12-28 RX ADMIN — KETOROLAC TROMETHAMINE 15 MG: 30 INJECTION, SOLUTION INTRAMUSCULAR; INTRAVENOUS at 22:26

## 2021-12-28 RX ADMIN — MORPHINE SULFATE 4 MG: 4 INJECTION INTRAVENOUS at 11:13

## 2021-12-28 RX ADMIN — ONDANSETRON 4 MG: 4 TABLET, ORALLY DISINTEGRATING ORAL at 08:38

## 2021-12-28 RX ADMIN — ASPIRIN 81 MG: 81 TABLET, COATED ORAL at 08:37

## 2021-12-28 RX ADMIN — PREGABALIN 300 MG: 100 CAPSULE ORAL at 08:46

## 2021-12-28 RX ADMIN — SODIUM CHLORIDE: 9 INJECTION, SOLUTION INTRAVENOUS at 14:52

## 2021-12-28 RX ADMIN — FENTANYL CITRATE 25 MCG: 50 INJECTION, SOLUTION INTRAMUSCULAR; INTRAVENOUS at 16:49

## 2021-12-28 RX ADMIN — TOPIRAMATE 100 MG: 100 TABLET, FILM COATED ORAL at 20:09

## 2021-12-28 RX ADMIN — PREGABALIN 300 MG: 100 CAPSULE ORAL at 20:09

## 2021-12-28 RX ADMIN — PHENAZOPYRIDINE HYDROCHLORIDE 200 MG: 100 TABLET ORAL at 18:23

## 2021-12-28 RX ADMIN — SODIUM CHLORIDE, PRESERVATIVE FREE 10 ML: 5 INJECTION INTRAVENOUS at 08:39

## 2021-12-28 RX ADMIN — ONDANSETRON 4 MG: 4 TABLET, ORALLY DISINTEGRATING ORAL at 18:05

## 2021-12-28 RX ADMIN — SODIUM CHLORIDE: 9 INJECTION, SOLUTION INTRAVENOUS at 04:42

## 2021-12-28 RX ADMIN — ATORVASTATIN CALCIUM 10 MG: 10 TABLET, FILM COATED ORAL at 08:38

## 2021-12-28 RX ADMIN — MORPHINE SULFATE 4 MG: 4 INJECTION INTRAVENOUS at 02:40

## 2021-12-28 RX ADMIN — IBUPROFEN 400 MG: 400 TABLET ORAL at 22:28

## 2021-12-28 RX ADMIN — FAMOTIDINE 20 MG: 20 TABLET ORAL at 08:38

## 2021-12-28 RX ADMIN — PANTOPRAZOLE SODIUM 40 MG: 40 TABLET, DELAYED RELEASE ORAL at 20:09

## 2021-12-28 RX ADMIN — CEFTRIAXONE SODIUM 1000 MG: 1 INJECTION, POWDER, FOR SOLUTION INTRAMUSCULAR; INTRAVENOUS at 08:57

## 2021-12-28 RX ADMIN — MORPHINE SULFATE 4 MG: 4 INJECTION INTRAVENOUS at 13:49

## 2021-12-28 RX ADMIN — MORPHINE SULFATE 4 MG: 4 INJECTION INTRAVENOUS at 08:47

## 2021-12-28 RX ADMIN — TRAMADOL HYDROCHLORIDE 50 MG: 50 TABLET, COATED ORAL at 18:05

## 2021-12-28 RX ADMIN — LIDOCAINE HYDROCHLORIDE 100 MG: 20 INJECTION, SOLUTION INTRAVENOUS at 16:18

## 2021-12-28 RX ADMIN — METOPROLOL SUCCINATE 25 MG: 25 TABLET, EXTENDED RELEASE ORAL at 08:38

## 2021-12-28 RX ADMIN — ONDANSETRON 4 MG: 2 INJECTION INTRAMUSCULAR; INTRAVENOUS at 02:40

## 2021-12-28 RX ADMIN — SERTRALINE HYDROCHLORIDE 150 MG: 100 TABLET ORAL at 08:37

## 2021-12-28 RX ADMIN — ENOXAPARIN SODIUM 40 MG: 100 INJECTION SUBCUTANEOUS at 08:38

## 2021-12-28 RX ADMIN — ONDANSETRON 4 MG: 2 INJECTION INTRAMUSCULAR; INTRAVENOUS at 16:30

## 2021-12-28 RX ADMIN — LISINOPRIL 20 MG: 20 TABLET ORAL at 08:38

## 2021-12-28 ASSESSMENT — PAIN SCALES - GENERAL
PAINLEVEL_OUTOF10: 8
PAINLEVEL_OUTOF10: 5
PAINLEVEL_OUTOF10: 8
PAINLEVEL_OUTOF10: 5
PAINLEVEL_OUTOF10: 7
PAINLEVEL_OUTOF10: 0
PAINLEVEL_OUTOF10: 10
PAINLEVEL_OUTOF10: 0
PAINLEVEL_OUTOF10: 7
PAINLEVEL_OUTOF10: 6
PAINLEVEL_OUTOF10: 5
PAINLEVEL_OUTOF10: 5
PAINLEVEL_OUTOF10: 7
PAINLEVEL_OUTOF10: 4

## 2021-12-28 ASSESSMENT — PULMONARY FUNCTION TESTS
PIF_VALUE: 18
PIF_VALUE: 17
PIF_VALUE: 22
PIF_VALUE: 22
PIF_VALUE: 18
PIF_VALUE: 17
PIF_VALUE: 17
PIF_VALUE: 18
PIF_VALUE: 4
PIF_VALUE: 17
PIF_VALUE: 18
PIF_VALUE: 20
PIF_VALUE: 18
PIF_VALUE: 22
PIF_VALUE: 18
PIF_VALUE: 17
PIF_VALUE: 18

## 2021-12-28 NOTE — PROGRESS NOTES
Patient has insulin pump with regulation for humalog for type 1 diabetes. Patient stated she would like to leave on her pump and monitor herself due to regulation of continuous flow for basal rate. Patient requested to have pump stay on.

## 2021-12-28 NOTE — CONSULTS
[facility] [address]   Consult Note  [facility] 1 2 3 4 5    Date: 2021   Patient: Zackary Macdonald   : 1975   DOA: 2021   MRN: [id number]   ROOM#: -A     Reason for Consult:  Left ureteral stone  Requesting Physician:  Dr. Rajan Ramirez  Collaborating Urologist on Call at time of admission: Danay Gentle:  \"My left side hurts\"    History Obtained From:  patient    HISTORY OF PRESENT ILLNESS:                The patient is a 48 y.o. female with significant past medical history of kidney stones who presented with left flank pain - ct scan with 9mm left UPJ stone    Past Medical History:        Diagnosis Date    Depression     Diabetes mellitus ([address]     GERD (gastroesophageal reflux disease)     Hypertension     Thyroid disease      Past Surgical History:        Procedure Laterality Date    [facility] GASTRIC BYPASS SURGERY      HYSTERECTOMY, TOTAL ABDOMINAL  2017    KIDNEY STONE SURGERY      SPINAL CORD STIMULATOR SURGERY      TONSILLECTOMY       Current Medications:   Current Facility-Administered Medications: sodium chloride flush 0.9 % injection 5-40 mL, 5-40 mL, IntraVENous, 2 times per day  sodium chloride flush 0.9 % injection 5-40 mL, 5-40 mL, IntraVENous, PRN  0.9 % sodium chloride infusion, 25 mL, IntraVENous, PRN  enoxaparin (LOVENOX) injection 40 mg, 40 mg, SubCUTAneous, Daily  ondansetron (ZOFRAN-ODT) disintegrating tablet 4 mg, 4 mg, Oral, Q8H PRN **OR** ondansetron (ZOFRAN) injection 4 mg, 4 mg, IntraVENous, Q6H PRN  polyethylene glycol (GLYCOLAX) packet 17 g, 17 g, Oral, Daily PRN  acetaminophen (TYLENOL) tablet 650 mg, 650 mg, Oral, Q6H PRN **OR** acetaminophen (TYLENOL) suppository 650 mg, 650 mg, Rectal, Q6H PRN  0.9 % sodium chloride infusion, , IntraVENous, Continuous  cefTRIAXone (ROCEPHIN) 1000 mg IVPB in 50 mL D5W minibag, 1,000 mg, IntraVENous, Q24H  glucose (GLUTOSE) 40 % oral gel 15 g, 15 g, Oral, PRN  dextrose 50 % IV solution, 12.5 g, IntraVENous, PRN  glucagon (rDNA) injection 1 mg, 1 mg, IntraMUSCular, PRN  dextrose 5 % solution, 100 mL/hr, IntraVENous, PRN  aspirin EC tablet 81 mg, 81 mg, Oral, Daily  famotidine (PEPCID) tablet 20 mg, 20 mg, Oral, Daily  levothyroxine (SYNTHROID) tablet 150 mcg, 150 mcg, Oral, Daily  lisinopril (PRINIVIL;ZESTRIL) tablet 20 mg, 20 mg, Oral, Daily  pantoprazole (PROTONIX) tablet 40 mg, 40 mg, Oral, Nightly  pregabalin (LYRICA) capsule 300 mg, 300 mg, Oral, BID  sertraline (ZOLOFT) tablet 150 mg, 150 mg, Oral, Daily  atorvastatin (LIPITOR) tablet 10 mg, 10 mg, Oral, Daily  topiramate (TOPAMAX) tablet 100 mg, 100 mg, Oral, Nightly  metoprolol succinate (TOPROL XL) extended release tablet 25 mg, 25 mg, Oral, Daily  morphine sulfate (PF) injection 4 mg, 4 mg, IntraVENous, Q2H PRN    Allergies:  Dilaudid [hydromorphone hcl] and Metformin and related    Social History:   TOBACCO:   reports that she has never smoked. She has never used smokeless tobacco.  ETOH:   reports previous alcohol use. DRUGS:   reports no history of drug use. Family History:   History reviewed. No pertinent family history. REVIEW OF SYSTEMS:     CONSTITUTIONAL:  negative  EYES:  negative  HEENT:  negative  RESPIRATORY:  negative  CARDIOVASCULAR:  negative  GASTROINTESTINAL:  positive for nausea and abdominal pain  GENITOURINARY:  negative  INTEGUMENT/BREAST:  negative  HEMATOLOGIC/LYMPHATIC:  negative  ALLERGIC/IMMUNOLOGIC:  negative  ENDOCRINE:  negative  MUSCULOSKELETAL:  negative  NEUROLOGICAL:  negative  BEHAVIOR/PSYCH:  negative    PHYSICAL EXAM:      VITALS:  /83   Pulse 63   Temp 97.4 °F (36.3 °C) (Oral)   Resp 16   Ht 5' 3\" (1.6 m)   Wt 195 lb (88.5 kg)   SpO2 93%   BMI 34.54 kg/m²      TEMPERATURE:  Current - Temp: 97.4 °F (36.3 °C);  Max - Temp  Av.3 °F (36.3 °C)  Min: 97.1 °F (36.2 °C)  Max: 97.4 °F (36.3 °C)  24HR BLOOD PRESSURE RANGE: Systolic (50RFE), RVV:891 , Min:103 , UNZ:499   ; Diastolic (08GXU), FWD:75, Min:60, Max:83      /83   Pulse 63   Temp 97.4 °F (36.3 °C) (Oral)   Resp 16   Ht 5' 3\" (1.6 m)   Wt 195 lb (88.5 kg)   SpO2 93%   BMI 34.54 kg/m²   General appearance: alert, appears stated age and cooperative  Lungs: no labored respirations  Heart: regular rate and rhythm  Abdomen: soft, non-tender; bowel sounds normal; no masses,  no organomegaly    DATA:    WBC:    Lab Results   Component Value Date    WBC 7.0 12/27/2021     Hemoglobin/Hematocrit:    Lab Results   Component Value Date    HGB 12.6 12/27/2021    HCT 39.7 12/27/2021     BMP:    Lab Results   Component Value Date     12/27/2021    K 3.6 12/27/2021     12/27/2021    CO2 21 12/27/2021    BUN 10 12/27/2021    CREATININE 0.6 12/27/2021    CALCIUM 9.0 12/27/2021    GFRAA >60 12/27/2021    LABGLOM >60 12/27/2021     PT/INR:  No results found for: PROTIME, INR    Blood Culture:   Urine Culture:     Imaging:  CT ABDOMEN PELVIS WO CONTRAST Additional Contrast? None    Result Date: 12/27/2021  EXAMINATION: CT OF THE ABDOMEN AND PELVIS WITHOUT CONTRAST 12/27/2021 1:55 pm TECHNIQUE: CT of the abdomen and pelvis was performed without the administration of intravenous contrast. Multiplanar reformatted images are provided for review. Dose modulation, iterative reconstruction, and/or weight based adjustment of the mA/kV was utilized to reduce the radiation dose to as low as reasonably achievable. COMPARISON: None. HISTORY: ORDERING SYSTEM PROVIDED HISTORY: flank pain and dysuria TECHNOLOGIST PROVIDED HISTORY: Reason for exam:->flank pain and dysuria Additional Contrast?->None Decision Support Exception - unselect if not a suspected or confirmed emergency medical condition->Emergency Medical Condition (MA) Is the patient pregnant?->No FINDINGS: Lower Chest: Lung bases clear. Organs: Unremarkable liver, spleen, pancreas, and adrenals on this unenhanced study.   Status post cholecystectomy. A 0.9 cm stone at the left UPJ causing minimal left-sided hydronephrosis. Additional punctate nonobstructing stones in the left kidney. No right-sided radiopaque urolithiasis. GI/Bowel: No CT evidence of acute appendicitis. Status post gastric bypass surgery. Bowel loops nonobstructed. Pelvis: Prior hysterectomy. No adnexal mass. No radiopaque stone in the incompletely distended urinary bladder. Peritoneum/Retroperitoneum: No free air or free fluid. No adenopathy. Minimal vascular calcification. No abdominal aortic aneurysm Bones/Soft Tissues: A small fat containing periumbilical hernia. Multilevel thoracolumbar spondylosis. Mild osteoarthritis of the bilateral hip joints. A 0.9 cm stone at the left UPJ. Minimal left-sided hydronephrosis. Multiple punctate nonobstructing stones in the left kidney. No right-sided radiopaque urolithiasis. RECOMMENDATIONS: Unavailable          Assessment & Plan:      Severa Bonito is a 48y.o. year old female admitted 12/27/2021 for 9mm left UPJ stone    1) Discussed options with patient for treatment including doing nothing - due to size of stone and  Pain I recommend left ureteroscopy and she is agreeable to proceed today      Patient seen and examined, chart reviewed.      Electronically signed by Zaina Betts MD on 12/28/2021 at 7:51 AM

## 2021-12-28 NOTE — H&P
HISTORY AND PHYSICAL  (Hospitalist, Internal Medicine)  IDENTIFYING INFORMATION   PATIENT:  Jeremy Baker  MRN:  [id number]  ADMIT DATE: 12/27/2021    Patient seen and examined 12/28/2021-3:30 AM  CHIEF COMPLAINT   Transferred from [facility] for renal calculi    HISTORY OF PRESENT ILLNESS   Jeremy Baker is a 48 y.o. female with diabetes mellitus type 1, on insulin pump, diabetic neuropathy, hypertension, hypothyroidism, depression, obesity, psoriatic arthritis, pernicious anemia, achalasia/GERD, fibromyalgia, secondary hyperparathyroidism, history of COVID 6/2020 presented to ED with complaints of left flank pain since 12/25/2021. Patient reported sharp, 8-9/10 intensity, intermittent, radiating to groin, denied any aggravating and relieving factors. Patient reported having dysuria, noticed blood on toilet paper. Also had fever, chills on 12/26-measured fever at 101. Associated nausea, no vomiting. Denied any chest pain, shortness of breath, denied any abdominal pain, denied any constipation or diarrhea. At presentation patient was noted to have /83, HR 89, RR 18, temp 97.1, saturating 95% on room air. CBC, CMP within normal range. Random glucose 121. UA-suggestive of infection. CT abdomen/pelvis-0.9 cm stone at the left UPJ, minimal left-sided hydronephrosis. Rapid Covid negative. Urology-Dr. uSraj Bartholomew consulted from ED and patient transferred to [facility]. Patient is vaccinated for Covid.   PAST MEDICAL HISTORY PAST SURGICAL HISTORY    diabetes mellitus type 1, on insulin pump, diabetic neuropathy, hypertension, hypothyroidism, depression, obesity, psoriatic arthritis, pernicious anemia, achalasia/GERD, fibromyalgia, secondary hyperparathyroidism, history of COVID 6/2020  Spinal cord stimulator, gastric bypass   FAMILY HISTORY SOCIAL HISTORY    Reviewed and noncontributory  Denies smoking, admits to occasional alcohol use, denies any illicit drug use   MEDICATIONS ALLERGIES    Reviewed medications with patient-insulin pump, Lyrica 300 mg twice daily, lisinopril 20 mg daily, levothyroxine 150 MCG daily, simvastatin 20 mg daily, Topamax 100 mg every afternoon, metoprolol succinate 25 mg daily, Ozempic q. weekly, Pepcid 20 mg every morning, omeprazole 40 mg every afternoon, aspirin 81 mg daily, multivitamins, calcium, vitamin B12, sertraline 150 mg daily   Dilaudid, Metformin       PAST MEDICAL, SURGICAL, FAMILY, and SOCIAL HISTORY         Past Medical History:   Diagnosis Date    Depression     Diabetes mellitus ([facility] [address]     GERD (gastroesophageal reflux disease)     Hypertension     Thyroid disease      Past Surgical History:   Procedure Laterality Date    [address]  1989   [address]    GASTRIC BYPASS SURGERY  2014    HYSTERECTOMY, TOTAL ABDOMINAL  2017    KIDNEY STONE SURGERY  2017    SPINAL CORD STIMULATOR SURGERY  2017   [address]     History reviewed. No pertinent family history.   Family Hx of HTN  Family Hx as reviewed above, otherwise non-contributory  Social History     Socioeconomic History    Marital status: Single     Spouse name: None    Number of children: None    Years of education: None    Highest education level: None   Occupational History    None   Tobacco Use    Smoking status: Never Smoker    Smokeless tobacco: Never Used   Vaping Use    Vaping Use: Never used   Substance and Sexual Activity    Alcohol use: Not Currently     Comment: occ    Drug use: Never    Sexual activity: None   Other Topics Concern    None   Social History Narrative    None     Social Determinants of Health     Financial Resource Strain:     Difficulty of Paying Living Expenses: Not on file   Food Insecurity:     Worried About Running Out of Food in the Last Year: Not on file    Nena of Food in the Last Year: Not on file   Transportation Needs:     Lack of Transportation (Medical): Not on file    Lack of Transportation (Non-Medical): Not on file   Physical Activity:     Days of Exercise per Week: Not on file    Minutes of Exercise per Session: Not on file   Stress:     Feeling of Stress : Not on file   Social Connections:     Frequency of Communication with Friends and Family: Not on file    Frequency of Social Gatherings with Friends and Family: Not on file    Attends Buddhism Services: Not on file    Active Member of [address] [place] [address] or Organizations: Not on file    Attends Club or Organization Meetings: Not on file    Marital Status: Not on file   Intimate Partner Violence:     Fear of Current or Ex-Partner: Not on file    Emotionally Abused: Not on file    Physically Abused: Not on file    Sexually Abused: Not on file   Housing Stability:     Unable to Pay for Housing in the Last Year: Not on file    Number of [place] in the Last Year: Not on file    Unstable Housing in the Last Year: Not on file       MEDICATIONS   Medications Prior to Admission  Medications Prior to Admission: levothyroxine (SYNTHROID) 150 MCG tablet, Take 150 mcg by mouth Daily  lisinopril (PRINIVIL;ZESTRIL) 20 MG tablet, Take 20 mg by mouth daily  pregabalin (LYRICA) 150 MG capsule, Take 300 mg by mouth 2 times daily. simvastatin (ZOCOR) 20 MG tablet, Take 20 mg by mouth nightly  aspirin 81 MG EC tablet, Take 81 mg by mouth daily  topiramate (TOPAMAX) 100 MG tablet, Take 100 mg by mouth daily  omeprazole (PRILOSEC) 20 MG delayed release capsule, Take 40 mg by mouth nightly  famotidine (PEPCID) 20 MG tablet, Take 20 mg by mouth daily  Insulin Pump - Insulin regular, Inject into the skin continuous Insulin-to-Carb Ratio (ICR): Insulin Sensitivity Factor (ISF):  Target Blood Glucose:   Bolus Frequency:  sertraline (ZOLOFT) 100 MG tablet, Take 150 mg by mouth daily  promethazine (PHENERGAN) 25 MG tablet, Take 25 mg by mouth every 6 hours as needed for Nausea  meloxicam (MOBIC) 7.5 MG tablet, Take 2 tablets by mouth daily for 10 days    Current Medications  Current Facility-Administered Medications   Medication Dose Route Frequency Provider Last Rate Last Admin    sodium chloride flush 0.9 % injection 5-40 mL  5-40 mL IntraVENous 2 times per day Jada Hurst MD        sodium chloride flush 0.9 % injection 5-40 mL  5-40 mL IntraVENous PRN Jada Hurst MD        0.9 % sodium chloride infusion  25 mL IntraVENous PRN Jada Hurst MD        enoxaparin (LOVENOX) injection 40 mg  40 mg SubCUTAneous Daily Jada Hurst MD        ondansetron (ZOFRAN-ODT) disintegrating tablet 4 mg  4 mg Oral Q8H PRN Jada Hurst MD        Or    ondansetron (ZOFRAN) injection 4 mg  4 mg IntraVENous Q6H PRN Jada Hurst MD        polyethylene glycol (GLYCOLAX) packet 17 g  17 g Oral Daily PRN Jada Hurst MD        acetaminophen (TYLENOL) tablet 650 mg  650 mg Oral Q6H PRN Jada Hurst MD        Or   [facility] acetaminophen (TYLENOL) suppository 650 mg  650 mg Rectal Q6H PRN Jada Hurst MD        0.9 % sodium chloride infusion   IntraVENous Continuous Jada Hurst MD        cefTRIAXone (ROCEPHIN) 1000 mg IVPB in 50 mL D5W minibag  1,000 mg IntraVENous Q24H Jada Hurst MD        glucose (GLUTOSE) 40 % oral gel 15 g  15 g Oral PRN Jada Hurst MD        dextrose 50 % IV solution  12.5 g IntraVENous PRN Jada Hurst MD        glucagon (rDNA) injection 1 mg  1 mg IntraMUSCular PRN Jada Hurst MD        dextrose 5 % solution  100 mL/hr IntraVENous PRN Jada Hurst MD        morphine sulfate (PF) injection 4 mg  4 mg IntraVENous Q2H PRN Evangelina Hernandez MD   4 mg at 12/28/21 0240    ondansetron (ZOFRAN) injection 4 mg  4 mg IntraVENous Q6H PRN David Nicole DO   4 mg at 12/28/21 0240         Allergies  Allergies   Allergen Reactions    Dilaudid [Hydromorphone Hcl] Rash    Metformin And Related Nausea And Vomiting       REVIEW OF SYSTEMS   10 point review of systems conducted and pertinent positives and negatives as per HPI. PHYSICAL EXAM     Wt Readings from Last 3 Encounters:   12/27/21 195 lb (88.5 kg)   10/07/21 213 lb (96.6 kg)       Blood pressure 125/83, pulse 63, temperature 97.4 °F (36.3 °C), temperature source Oral, resp. rate 16, height 5' 3\" (1.6 m), weight 195 lb (88.5 kg), SpO2 93 %. GEN  -Awake, alert, NAD.   EYES   -PERRL. HENT  -MM are moist.   RESP  -LS CTA equal bilat, no wheezes, rales or rhonchi. Symmetric chest movement. No respiratory distress noted. C/V  -S1/S2 auscultated. RRR without appreciable M/R/G. No JVD or carotid bruits. Peripheral pulses equal bilaterally and palpable. No peripheral edema. No reproducible chest wall tenderness. GI  -Abdomen is soft, non-distended, no significant tenderness. No masses or guarding. + BS in all quadrants. Rectal exam deferred.   -left CVA tenderness. Overton catheter is not present. MS  -B/L extremities strong muscles strength. Full movements. No gross joint deformities. No swelling, intact sensation symmetrical.   SKIN  -Normal coloration, warm, dry. NEURO  - Awake, alert, oriented x 3 no focal deficits. ,  PSYC  - Appropriate affect. LABS AND IMAGING     Results for Ashley Coburn (MRN [id number]) as of 12/28/2021 04:00   Ref.  Range 12/27/2021 13:52   Sodium Latest Ref Range: 135 - 145 MMOL/L 143   Potassium Latest Ref Range: 3.5 - 5.1 MMOL/L 3.6   Chloride Latest Ref Range: 99 - 110 mMol/L 108   CO2 Latest Ref Range: 21 - 32 MMOL/L 21   BUN Latest Ref Range: 6 - 23 MG/DL 10   Creatinine Latest Ref Range: 0.6 - 1.1 MG/DL 0.6   Anion Gap Latest Ref Range: 4 - 16  14   GFR Non- Latest Ref Range: >60 mL/min/1.73m2 >60   GFR African American Latest Ref Range: >60 mL/min/1.73m2 >60   Glucose Latest Ref Range: 70 - 99 MG/ (H)   CALCIUM, SERUM, [id number] Latest Ref Range: 8.3 - 10.6 MG/DL 9.0   WBC Latest Ref Range: Range: 0 - 5 /HPF 30 (H)   RBC, UA Latest Ref Range: 0 - 6 /HPF 1   Epithelial Cells, UA Latest Units: /HPF 4   Bacteria, UA Latest Ref Range: NEGATIVE /HPF OCCASIONAL (A)   Crystal Type Latest Ref Range: NEGATIVE /HPF OCCASIONAL CALCIUM OXALATE CRYSTALS     Results for Loree Us (MRN [id number]) as of 12/28/2021 04:00   Ref. Range 12/27/2021 19:20   SARS-CoV-2, NAAT Latest Ref Range: NOT DETECTED  NOT DETECTED     Recent Imaging     CT ABDOMEN PELVIS WO CONTRAST Additional Contrast? None [[id number]] Collected: 12/27/21 1412     Order Status: Completed Updated: 12/27/21 1419     Narrative:       EXAMINATION:   CT OF THE ABDOMEN AND PELVIS WITHOUT CONTRAST 12/27/2021 1:55 pm     TECHNIQUE:   CT of the abdomen and pelvis was performed without the administration of   intravenous contrast. Multiplanar reformatted images are provided for review. Dose modulation, iterative reconstruction, and/or weight based adjustment of   the mA/kV was utilized to reduce the radiation dose to as low as reasonably   achievable. COMPARISON:   None. HISTORY:   ORDERING SYSTEM PROVIDED HISTORY: flank pain and dysuria   TECHNOLOGIST PROVIDED HISTORY:   Reason for exam:->flank pain and dysuria   Additional Contrast?->None   Decision Support Exception - unselect if not a suspected or confirmed   emergency medical condition->Emergency Medical Condition (MA)   Is the patient pregnant?->No     FINDINGS:   Lower Chest: Lung bases clear. Organs: Unremarkable liver, spleen, pancreas, and adrenals on this unenhanced   study.  Status post cholecystectomy.  A 0.9 cm stone at the left UPJ causing   minimal left-sided hydronephrosis.  Additional punctate nonobstructing stones   in the left kidney.  No right-sided radiopaque urolithiasis. GI/Bowel: No CT evidence of acute appendicitis.  Status post gastric bypass   surgery.  Bowel loops nonobstructed.      Pelvis: Prior hysterectomy.  No adnexal mass.  No radiopaque stone in the incompletely distended urinary bladder. Peritoneum/Retroperitoneum: No free air or free fluid.  No adenopathy. Minimal vascular calcification.  No abdominal aortic aneurysm     Bones/Soft Tissues: A small fat containing periumbilical hernia.  Multilevel   thoracolumbar spondylosis.  Mild osteoarthritis of the bilateral hip joints.      Impression:       A 0.9 cm stone at the left UPJ.  Minimal left-sided hydronephrosis. Multiple punctate nonobstructing stones in the left kidney. No right-sided radiopaque urolithiasis. RECOMMENDATIONS:   Unavailable          Relevant labs and imaging reviewed    ASSESSMENT AND PLAN     #. Left ureteral calculus:  -CT abdomen/pelvis-0.9 cm stone at the left UPJ, minimal left hydronephrosis. -Patient to be n.p.o.  -IV fluids, antiemetics, analgesics as needed.  -Ceftriaxone. #.  Complicated UTI   -UA suggestive of infection   -Urine culture sent from ED  -continue ceftriaxone. #. diabetes mellitus type 1, on insulin pump  -Patient would like to manage her own insulin pump. -Explained the risk of hypoglycemia as she is n.p.o. Advised to monitor closely for hypoglycemia. -Hypoglycemia protocol ordered. #. diabetic neuropathy  -Continue Lyrica    #. Hypertension-patient on lisinopril, metoprolol succinate    #. Hypothyroidism-continue levothyroxine    #. Depression-continue sertraline    #. Obesity with BMI 34.54-s/p gastric bypass  -Is on Ozempic q. Weekly    #. psoriatic arthritis    #. pernicious anemia-patient on vitamin B12 supplements    #. Achalasia/GERD-Pepcid, omeprazole    #. Fibromyalgia    #. secondary hyperparathyroidism    #. history of COVID 6/2020    DVT Prophylaxis: Lovenox  GI Prophylaxis: Protonix  Code Status: FULL.       Sheryle Shone, MD  Hospitalist, Internal Medicine  12/28/2021 at 3:27 AM

## 2021-12-28 NOTE — PROGRESS NOTES
CC:  9mm left UPJ stone           Acute left flank pain and nausea    Prior stones- Dr. Debbie Don  S/p gastric bypass  Prior UTI- some new urgency/frequency  Hx DM on insulin pump    On topamax for 8 months- weight loss and migraines    UA- cloudy and occasional bacteria.   Positive LE    Af,vss  A, ox3, nad  Soft, nd/nt, benign    Reviewed CT Scan 12/27/2021    Reviewed options and risks  Given size of stone she elected to proceed with surgery  Possible left ureteroscopy, holmium laser lithotripsy, and stone manipulation  If evidence of infection then possible stent placement only  She understands  She has had stents before    On rocephin    Plan  Proceed with possible left ureteroscopy, stone manipulation, and possible stent placement only  On rocephin  Urine culture pending  She will discuss topamax with prescribing MD due to stone risk

## 2021-12-28 NOTE — ANESTHESIA PRE PROCEDURE
Department of Anesthesiology  Preprocedure Note       Name:  Richard Garcia   Age:  48 y.o.  :  1975                                          MRN:  [id number]         Date:  2021      Surgeon: Nishant Morelos):  Elizabeth West MD    Procedure: Procedure(s):  CYSTOSCOPY LEFT RETROGRADE PYLEOGRAMS STONE MANIPULATION    Medications prior to admission:   Prior to Admission medications    Medication Sig Start Date End Date Taking? Authorizing Provider   metoprolol succinate (TOPROL XL) 25 MG extended release tablet Take 25 mg by mouth daily   Yes Historical Provider, MD   Semaglutide, 1 MG/DOSE, 2 MG/1.5ML SOPN Inject into the skin once a week    Yes Historical Provider, MD   levothyroxine (SYNTHROID) 150 MCG tablet Take 150 mcg by mouth Daily    Historical Provider, MD   lisinopril (PRINIVIL;ZESTRIL) 20 MG tablet Take 20 mg by mouth daily    Historical Provider, MD   pregabalin (LYRICA) 150 MG capsule Take 300 mg by mouth 2 times daily. Historical Provider, MD   simvastatin (ZOCOR) 20 MG tablet Take 20 mg by mouth nightly    Historical Provider, MD   aspirin 81 MG EC tablet Take 81 mg by mouth daily    Historical Provider, MD   topiramate (TOPAMAX) 100 MG tablet Take 100 mg by mouth daily    Historical Provider, MD   omeprazole (PRILOSEC) 20 MG delayed release capsule Take 40 mg by mouth nightly    Historical Provider, MD   famotidine (PEPCID) 20 MG tablet Take 20 mg by mouth daily    Historical Provider, MD   Insulin Pump - Insulin regular Inject into the skin continuous Insulin-to-Carb Ratio (ICR): Insulin Sensitivity Factor (ISF):   Target Blood Glucose:    Bolus Frequency:    Historical Provider, MD   sertraline (ZOLOFT) 100 MG tablet Take 150 mg by mouth daily    Historical Provider, MD   promethazine (PHENERGAN) 25 MG tablet Take 25 mg by mouth every 6 hours as needed for Nausea    Historical Provider, MD   meloxicam (MOBIC) 7.5 MG tablet Take 2 tablets by mouth daily for 10 days 10/7/21 10/17/21 [facility]       Current medications:    Current Facility-Administered Medications   Medication Dose Route Frequency Provider Last Rate Last Admin    sodium chloride flush 0.9 % injection 5-40 mL  5-40 mL IntraVENous 2 times per day Mary Carlisle MD   10 mL at 12/28/21 0839    sodium chloride flush 0.9 % injection 5-40 mL  5-40 mL IntraVENous PRN Mary Carlisle MD        0.9 % sodium chloride infusion  25 mL IntraVENous PRN Mary Carlisle MD        enoxaparin (LOVENOX) injection 40 mg  40 mg SubCUTAneous Daily Mary Carlisle MD   40 mg at 12/28/21 0838    ondansetron (ZOFRAN-ODT) disintegrating tablet 4 mg  4 mg Oral Q8H PRN Mary Carlisle MD   4 mg at 12/28/21 0615    Or    ondansetron (ZOFRAN) injection 4 mg  4 mg IntraVENous Q6H PRN Mary Carlisle MD        polyethylene glycol (GLYCOLAX) packet 17 g  17 g Oral Daily PRN Mary Carlisle MD        acetaminophen (TYLENOL) tablet 650 mg  650 mg Oral Q6H PRN Mary Carlisle MD        Or    acetaminophen (TYLENOL) suppository 650 mg  650 mg Rectal Q6H PRN Mary Carlisle MD        0.9 % sodium chloride infusion   IntraVENous Continuous Mary Carlisle  mL/hr at 12/28/21 0442 New Bag at 12/28/21 0442    cefTRIAXone (ROCEPHIN) 1000 mg IVPB in 50 mL D5W minibag  1,000 mg IntraVENous Q24H Mary Carlisle  mL/hr at 12/28/21 0857 1,000 mg at 12/28/21 0857    glucose (GLUTOSE) 40 % oral gel 15 g  15 g Oral PRN Mary Carlisle MD        dextrose 50 % IV solution  12.5 g IntraVENous PRN Mary Carlisle MD        glucagon (rDNA) injection 1 mg  1 mg IntraMUSCular PRN Mary Carlisle MD        dextrose 5 % solution  100 mL/hr IntraVENous PRN Mary Carlisle MD        aspirin EC tablet 81 mg  81 mg Oral Daily Mary Carlisle MD   81 mg at 12/28/21 0837    famotidine (PEPCID) tablet 20 mg  20 mg Oral Daily Mary Carlisle MD   20 mg at 12/28/21 8519 Vitals:    12/28/21 0100 12/28/21 0151 12/28/21 0240 12/28/21 0823   BP: 122/73  125/83 126/69   Pulse:   63 58   Resp:   16 16   Temp:   36.3 °C (97.4 °F) 36.7 °C (98.1 °F)   TempSrc:   Oral Oral   SpO2: 92% 93%  98%   Weight:       Height:                                                  BP Readings from Last 3 Encounters:   12/28/21 126/69   10/07/21 132/81       NPO Status:                                                                                 BMI:   Wt Readings from Last 3 Encounters:   12/27/21 195 lb (88.5 kg)   10/07/21 213 lb (96.6 kg)     Body mass index is 34.54 kg/m². CBC:   Lab Results   Component Value Date    WBC 7.0 12/27/2021    RBC 4.26 12/27/2021    HGB 12.6 12/27/2021    HCT 39.7 12/27/2021    MCV 93.2 12/27/2021    RDW 12.9 12/27/2021     12/27/2021       CMP:   Lab Results   Component Value Date     12/27/2021    K 3.6 12/27/2021     12/27/2021    CO2 21 12/27/2021    BUN 10 12/27/2021    CREATININE 0.6 12/27/2021    GFRAA >60 12/27/2021    LABGLOM >60 12/27/2021    GLUCOSE 121 12/27/2021    CALCIUM 9.0 12/27/2021       POC Tests: No results for input(s): POCGLU, POCNA, POCK, POCCL, POCBUN, POCHEMO, POCHCT in the last 72 hours.     Coags: No results found for: PROTIME, INR, APTT    HCG (If Applicable): No results found for: PREGTESTUR, PREGSERUM, HCG, HCGQUANT     ABGs: No results found for: PHART, PO2ART, XUY9UBZ, WXO9VKZ, BEART, Y5OKPDAQ     Type & Screen (If Applicable):  No results found for: LABABO, LABRH    Drug/Infectious Status (If Applicable):  No results found for: HIV, HEPCAB    COVID-19 Screening (If Applicable):   Lab Results   Component Value Date    COVID19 NOT DETECTED 12/27/2021           Anesthesia Evaluation  Patient summary reviewed  Airway: Mallampati: II        Dental:          Pulmonary: breath sounds clear to auscultation                             Cardiovascular:    (+) hypertension:,         Rhythm: regular             Beta Blocker: Dose within 24 Hrs         Neuro/Psych:   (+) depression/anxiety              ROS comment: Fibromyalgia GI/Hepatic/Renal:   (+) GERD:, renal disease: kidney stones,           Endo/Other:    (+) DiabetesType I DM, , hypothyroidism::., .                 Abdominal:             Vascular: Other Findings:           Anesthesia Plan      general and MAC     ASA 3       Induction: intravenous. MIPS: Postoperative opioids intended. Anesthetic plan and risks discussed with patient. Plan discussed with CRNA.     Attending anesthesiologist reviewed and agrees with Preprocedure content            JULIA Capellan - CRNA   12/28/2021

## 2021-12-28 NOTE — CARE COORDINATION
LSW reviewed chart/call to Pt room to initiate discharge planning. LSW introduced self and role of LSW. Pt is from home alone. Pt drives. 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 Pt does not currently have a PCP. Pt is new to her insurance. LSW will provide a list of PCP's Pt is independent of ALD's prior to admission. Pt does not have any DME or home care. Pt plan is home with no needs.

## 2021-12-28 NOTE — ANESTHESIA POSTPROCEDURE EVALUATION
Department of Anesthesiology  Postprocedure Note    Patient: Lupe Cordon  MRN: [id number]  YOB: 1975  Date of evaluation: 12/28/2021  Time:  4:52 PM     Procedure Summary     Date: 12/28/21 Room / Location: [place] [address] / [facility]    Anesthesia Start: 1611 Anesthesia Stop: 6379    Procedure: CYSTOSCOPY LEFT RETROGRADE PYLEOGRAMS Stent placement (Left Groin) Diagnosis: (renal calculi)    Surgeons: Israel Coreas MD Responsible Provider: Savage Collier MD    Anesthesia Type: general, MAC ASA Status: 3          Anesthesia Type: general, MAC    Olivier Phase I:      Olivier Phase II:      Last vitals: Reviewed and per EMR flowsheets.        Anesthesia Post Evaluation    Patient location during evaluation: PACU  Patient participation: complete - patient participated  Level of consciousness: awake and alert  Pain score: 4  Airway patency: patent  Nausea & Vomiting: no vomiting and no nausea  Complications: no  Cardiovascular status: blood pressure returned to baseline and hemodynamically stable  Respiratory status: acceptable, spontaneous ventilation, nonlabored ventilation and nasal cannula  Hydration status: stable

## 2021-12-28 NOTE — PROGRESS NOTES
1647: Patient arrived to PACU from OR. Monitors applied, alarms on. Report obtained from [facility] and Modesto Salas. CRNA.  [phone]: Dr. Nava Roberson at bedside speaking with patient. 1700: Patient tolerating ice chips at this time. 1720: Report called to [address] [facility] for room 4325.   1730: Patient transferred to [id number].

## 2021-12-28 NOTE — PROGRESS NOTES
4 Eyes Skin Assessment     NAME:  Kai Mcmullen  YOB: 1975  MEDICAL RECORD NUMBER:  [id number]    The patient is being assess for  Admission    I agree that 2 RN's have performed a thorough Head to Toe Skin Assessment on the patient. ALL assessment sites listed below have been assessed. Areas assessed by both nurses:    Head, Face, Ears, Shoulders, Back, Chest, Arms, Elbows, Hands, Sacrum. Buttock, Coccyx, Ischium and Legs. Feet and Heels        Does the Patient have a Wound?  {Action Wound:38915}       Dionisio Prevention initiated:  No   Wound Care Orders initiated:  No    Pressure Injury (Stage 3,4, Unstageable, DTI, NWPT, and Complex wounds) if present place consult order under [de-identified] No    New and Established Ostomies if present place consult order under : No      Nurse 1 eSignature: Electronically signed by Dewey Velez LPN on 47/11/66 at 1:18 AM EST    **SHARE this note so that the co-signing nurse is able to place an eSignature**    Nurse 2 eSignature: {Esignature:[id number]}

## 2021-12-28 NOTE — PROGRESS NOTES
Comprehensive Nutrition Assessment    Type and Reason for Visit:  Initial,Positive Nutrition Screen (N/V)    Nutrition Recommendations/Plan:   · Advance diet as medically able  · Recommend Carb Control 4 Diet   · Please obtain measured weight for more accurate nutrition assessment     Nutrition Assessment:  Admitted with renal calculi. Currently pt is NPO, awaiting for cystoscopy left retrograde pyloegrams stone manipulation. Significant wt loss noted over 6 months. Spoke with patient, states intentional and purposeful wt loss with [place] [facility]. Home diet of low carb, high protein. Upon arrival, patient complains of associated nausea, blood in urine, and flank pain. Denies any N/V issues at this time. Will monitor closely. Malnutrition Assessment:  Malnutrition Status:  No malnutrition    Context:  Acute Illness     Findings of the 6 clinical characteristics of malnutrition:  Energy Intake:  No significant decrease in energy intake (unintentional)  Weight Loss:  No significant weight loss (unintentional)     Body Fat Loss:  No significant body fat loss     Muscle Mass Loss:  Unable to assess    Fluid Accumulation:  No significant fluid accumulation     Strength:  Not Performed    Estimated Daily Nutrient Needs:  Energy (kcal):  5415-4821 ([place] 1898); Weight Used for Energy Requirements:  Current     Protein (g):  57-68 (1.1-1.3 g/kg IBW); Weight Used for Protein Requirements:  Ideal        Fluid (ml/day):  fluids per nephrology; Method Used for Fluid Requirements:  Standard Renal      Nutrition Related Findings:  H/O RNY, Type 1 Diabetes Mellitus;  Pt follows CHO counting, choose complex carb intake vs. simple, and aims for high protein intake      Wounds:  None       Current Nutrition Therapies:    Diet NPO    Anthropometric Measures:  · Height: 5' 3\" (160 cm)  · Current Body Weight: 195 lb 1.7 oz (88.5 kg)   · Admission Body Weight:  (stated)    · Usual Body Weight:

## 2021-12-28 NOTE — DISCHARGE INSTR - DIET
Good nutrition is important when healing from an illness, injury, or surgery. Follow any nutrition recommendations given to you during your hospital stay. If you were given an oral nutrition supplement while in the hospital, continue to take this supplement at home. You can take it with meals, in-between meals, and/or before bedtime. These supplements can be purchased at most local grocery stores, pharmacies, and chain super-stores. If you have any questions about your diet or nutrition, call the hospital and ask for the dietitian. ([phone])    Diet Education over Kidney Stone Nutrition Strategies   Provided by Dietitian, Fritz Parekh RDN, MICHAELLE from [facility] from [address] [place] [address]     Preventing kidney stones with diet may mean changing what you eat. Depending on your risk factors and on the type of kidney stones you form, you may be advised to make changes. Nutrition therapy, as with other forms of therapy, is prescribed individually according to you and your specific needs and risks. Your registered dietitian (RD) will design and explain a plan to meet your needs. Following the nutrition recommendations prescribed to you is not always easy. Below are some strategies that may help you achieve your goals for stone prevention. Tips for Increasing Your Fluid Intake to Increase Urine Volume  Do you feel like you already drink enough or that you are always going to the bathroom, yet you were still told you have low urine volume? It is helpful to know how much you drink and how much urine you produce in a typical day. This can help you understand how much you need to drink to produce the target amount of urine in a day. For a day or two, measure your total fluid intake. - Use household measuring cups and drink beverages in bottles or cans with the fluid content listed on the label.  -Keep a log, and add up the ounces at the end of the day or within a 24-hour period.      In the same day or on any other day, measure your urine output. This may be easier to do while you are at home. - Keep a paper and pen in your bathroom. Use a household measuring cup to measure your urine volume every time you void. Record the amount of urine voided before discarding it. - Repeat throughout the day and night.  - Add up the total amount of urine you voided within a 24-hour period. Use this total to confirm you are reaching the urine volume recommended. Another idea is to break your day into 3 or 4 sections with a goal for fluid intake within each section that, within a 24-hour period, gets you to your fluid intake goal. Use containers with visible fluid markings to help guide you. Also, measure out how much your glassware holds. That will give you an idea of how much fluid you are drinking at home on a day-to-day basis. Remember to drink more fluids to replace what is lost during sweating, as when you exercise heavily or in hot weather. --------  While most of your potassium will come from fruits and vegetables, especially with 5 servings or more per day, milk and yogurt are also good sources. Some fish, such as halibut and salmon, are high in potassium, too. Tips for Increasing Your Potassium Intake If You Are on a Diuretic Medication  Diuretics are medications that reduce urine calcium excretion. A potential unwanted side effect may be that your body wastes or loses potassium. While effective in reducing urine calcium, the loss of potassium from diuretics could make you low in potassium. If on a diuretic, eat at least 1 to 2 fruits and/or vegetables high in potassium daily to reduce this risk.     Examples are:   ? medium avocado   1 small banana   8 ounces orange juice or prune juice   8 ounces tomato juice or carrot juice (choose low salt)   1 medium baked potato   1 cup cooked cabbage   ½ cup cooked legumes (lima beans, navy beans, sosa beans, kidney beans, split  peas)    Tips for Eating at Least 5 Servings of Fruits and Vegetables Daily   Keep fruits and vegetables ready to eat in the fridge and on the counter. Have a piece of fruit, 6 ounces of fruit or vegetable juice, or a handful of berries at breakfast.   Pack a plastic baggie full of raw vegetables for lunch, for a daytime snack at work, or when on the run. Have fruit (a piece of fruit, a cup of berries, 15-20 grapes) at lunch. Eat a big salad for lunch; load it up with raw vegetables. Drink 6 ounces of low-sodium tomato or vegetable juice every day. Always have at least 2 different kinds of vegetables at dinner; examples are peas or beans AND broccoli or cauliflower. Substitute a starchy vegetable (like squash, beans, potatoes, peas) for your pasta or rice. Eat fruit as an evening snack; make an evening fruit smoothie with low-fat milk or Yogurt. Tips for Obtaining the Right Amount of Calcium: Usually 1,000 To 1,500 Milligrams Daily  Too little calcium is a risk factor for calcium oxalate stones because there is not enough calcium going through your digestive tract to bind with oxalate and prevent its absorption. This could lead to high urine oxalate. Too much calcium, more than your body can use, is a risk factor for calcium stones because it could cause you to excrete excessive calcium into your urine. Striking the right balance is important. Most adults require 1,000 to 1,500 milligrams of calcium daily. Children and adolescents from 9 to 19 years need 1,300 milligrams daily. Calcium needs are easily met by foods and beverages alone. Supplements are needed only if you are unable to get enough calcium from your diet. Even if supplements are advised, the amount of calcium from supplements will only be what is necessary to bring your total intake up to 1,000 to 1,500 milligrams daily.  After reviewing your current diet, your RD can estimate whether you are meeting your calcium needs and whether a supplement may be needed. REMEMBER: Dairy isnt the only source of calcium! Some ready-to-consume  breakfast cereals are fortified with as much as 1,000 milligrams of  calcium.      Here are other options:   Beverages providing between 300 and 500 milligrams of  calcium per serving:  o 8 ounces calcium-fortified nondairy milks or fruit juices  o 8 ounces milk, kefir, buttermilk, or eggnog  o 6 to 8 ounces of some yogurts  o 1 cup dried figs  o 4 ounces firm tofu, set with calcium   Foods providing between 200 and 400 milligrams of calcium per serving:  o 6 to 8 ounces of some yogurts or soy yogurts  o 1 ounce cheese  o Calcium-fortified foods - Read labels to know how much calcium is provided  per serving  o 3 ounces canned sardines or canned mackerel  o 1 cup cooked broccoli

## 2021-12-29 ENCOUNTER — APPOINTMENT (OUTPATIENT)
Dept: CT IMAGING | Age: 46
DRG: 660 | End: 2021-12-29
Payer: COMMERCIAL

## 2021-12-29 LAB
ANION GAP SERPL CALCULATED.3IONS-SCNC: 7 MMOL/L (ref 4–16)
BASOPHILS ABSOLUTE: 0 K/CU MM
BASOPHILS RELATIVE PERCENT: 0.6 % (ref 0–1)
BUN BLDV-MCNC: 10 MG/DL (ref 6–23)
CALCIUM SERPL-MCNC: 8.1 MG/DL (ref 8.3–10.6)
CHLORIDE BLD-SCNC: 110 MMOL/L (ref 99–110)
CO2: 23 MMOL/L (ref 21–32)
CREAT SERPL-MCNC: 0.6 MG/DL (ref 0.6–1.1)
CULTURE: NORMAL
DIFFERENTIAL TYPE: ABNORMAL
EOSINOPHILS ABSOLUTE: 0.1 K/CU MM
EOSINOPHILS RELATIVE PERCENT: 1 % (ref 0–3)
GFR AFRICAN AMERICAN: >60 ML/MIN/1.73M2
GFR NON-AFRICAN AMERICAN: >60 ML/MIN/1.73M2
GLUCOSE BLD-MCNC: 147 MG/DL (ref 70–99)
GLUCOSE BLD-MCNC: 198 MG/DL (ref 70–99)
GLUCOSE BLD-MCNC: 200 MG/DL (ref 70–99)
GLUCOSE BLD-MCNC: 220 MG/DL (ref 70–99)
GLUCOSE BLD-MCNC: 45 MG/DL (ref 70–99)
GLUCOSE BLD-MCNC: 55 MG/DL (ref 70–99)
GLUCOSE BLD-MCNC: 84 MG/DL (ref 70–99)
HCT VFR BLD CALC: 34 % (ref 37–47)
HEMOGLOBIN: 10.6 GM/DL (ref 12.5–16)
IMMATURE NEUTROPHIL %: 0.1 % (ref 0–0.43)
LYMPHOCYTES ABSOLUTE: 1.4 K/CU MM
LYMPHOCYTES RELATIVE PERCENT: 20.6 % (ref 24–44)
Lab: NORMAL
MCH RBC QN AUTO: 30.4 PG (ref 27–31)
MCHC RBC AUTO-ENTMCNC: 31.2 % (ref 32–36)
MCV RBC AUTO: 97.4 FL (ref 78–100)
MONOCYTES ABSOLUTE: 0.5 K/CU MM
MONOCYTES RELATIVE PERCENT: 7.5 % (ref 0–4)
NUCLEATED RBC %: 0 %
PDW BLD-RTO: 12.5 % (ref 11.7–14.9)
PLATELET # BLD: 216 K/CU MM (ref 140–440)
PMV BLD AUTO: 12 FL (ref 7.5–11.1)
POTASSIUM SERPL-SCNC: 3.9 MMOL/L (ref 3.5–5.1)
RBC # BLD: 3.49 M/CU MM (ref 4.2–5.4)
SEGMENTED NEUTROPHILS ABSOLUTE COUNT: 4.9 K/CU MM
SEGMENTED NEUTROPHILS RELATIVE PERCENT: 70.2 % (ref 36–66)
SODIUM BLD-SCNC: 140 MMOL/L (ref 135–145)
SPECIMEN: NORMAL
TOTAL IMMATURE NEUTOROPHIL: 0.01 K/CU MM
TOTAL NUCLEATED RBC: 0 K/CU MM
WBC # BLD: 7 K/CU MM (ref 4–10.5)

## 2021-12-29 PROCEDURE — 36415 COLL VENOUS BLD VENIPUNCTURE: CPT

## 2021-12-29 PROCEDURE — 94761 N-INVAS EAR/PLS OXIMETRY MLT: CPT

## 2021-12-29 PROCEDURE — 6370000000 HC RX 637 (ALT 250 FOR IP): Performed by: NURSE PRACTITIONER

## 2021-12-29 PROCEDURE — 82962 GLUCOSE BLOOD TEST: CPT

## 2021-12-29 PROCEDURE — 51798 US URINE CAPACITY MEASURE: CPT

## 2021-12-29 PROCEDURE — G0378 HOSPITAL OBSERVATION PER HR: HCPCS

## 2021-12-29 PROCEDURE — 6370000000 HC RX 637 (ALT 250 FOR IP): Performed by: UROLOGY

## 2021-12-29 PROCEDURE — 85025 COMPLETE CBC W/AUTO DIFF WBC: CPT

## 2021-12-29 PROCEDURE — 2580000003 HC RX 258: Performed by: UROLOGY

## 2021-12-29 PROCEDURE — 74176 CT ABD & PELVIS W/O CONTRAST: CPT

## 2021-12-29 PROCEDURE — 6370000000 HC RX 637 (ALT 250 FOR IP): Performed by: PHYSICIAN ASSISTANT

## 2021-12-29 PROCEDURE — 6360000002 HC RX W HCPCS: Performed by: UROLOGY

## 2021-12-29 PROCEDURE — 80048 BASIC METABOLIC PNL TOTAL CA: CPT

## 2021-12-29 PROCEDURE — 6360000002 HC RX W HCPCS: Performed by: NURSE PRACTITIONER

## 2021-12-29 RX ORDER — MORPHINE SULFATE 2 MG/ML
2 INJECTION, SOLUTION INTRAMUSCULAR; INTRAVENOUS ONCE
Status: COMPLETED | OUTPATIENT
Start: 2021-12-29 | End: 2021-12-29

## 2021-12-29 RX ORDER — OXYBUTYNIN CHLORIDE 5 MG/1
5 TABLET ORAL 3 TIMES DAILY
Status: DISCONTINUED | OUTPATIENT
Start: 2021-12-29 | End: 2021-12-30 | Stop reason: HOSPADM

## 2021-12-29 RX ORDER — HYDROCODONE BITARTRATE AND ACETAMINOPHEN 5; 325 MG/1; MG/1
1 TABLET ORAL EVERY 4 HOURS PRN
Status: DISCONTINUED | OUTPATIENT
Start: 2021-12-29 | End: 2021-12-30 | Stop reason: HOSPADM

## 2021-12-29 RX ORDER — HYDROCODONE BITARTRATE AND ACETAMINOPHEN 5; 325 MG/1; MG/1
1 TABLET ORAL EVERY 6 HOURS PRN
Status: DISCONTINUED | OUTPATIENT
Start: 2021-12-29 | End: 2021-12-29

## 2021-12-29 RX ADMIN — OXYBUTYNIN CHLORIDE 5 MG: 5 TABLET ORAL at 13:57

## 2021-12-29 RX ADMIN — TOPIRAMATE 100 MG: 100 TABLET, FILM COATED ORAL at 21:47

## 2021-12-29 RX ADMIN — PHENAZOPYRIDINE HYDROCHLORIDE 200 MG: 100 TABLET ORAL at 05:28

## 2021-12-29 RX ADMIN — PREGABALIN 300 MG: 100 CAPSULE ORAL at 21:46

## 2021-12-29 RX ADMIN — LISINOPRIL 20 MG: 20 TABLET ORAL at 08:32

## 2021-12-29 RX ADMIN — SODIUM CHLORIDE, PRESERVATIVE FREE 10 ML: 5 INJECTION INTRAVENOUS at 21:48

## 2021-12-29 RX ADMIN — HYDROCODONE BITARTRATE AND ACETAMINOPHEN 1 TABLET: 5; 325 TABLET ORAL at 18:08

## 2021-12-29 RX ADMIN — SODIUM CHLORIDE: 9 INJECTION, SOLUTION INTRAVENOUS at 11:47

## 2021-12-29 RX ADMIN — METOPROLOL SUCCINATE 25 MG: 25 TABLET, EXTENDED RELEASE ORAL at 08:32

## 2021-12-29 RX ADMIN — ONDANSETRON 4 MG: 2 INJECTION INTRAMUSCULAR; INTRAVENOUS at 23:16

## 2021-12-29 RX ADMIN — SERTRALINE HYDROCHLORIDE 150 MG: 100 TABLET ORAL at 08:32

## 2021-12-29 RX ADMIN — TRAMADOL HYDROCHLORIDE 50 MG: 50 TABLET, COATED ORAL at 08:32

## 2021-12-29 RX ADMIN — ATORVASTATIN CALCIUM 10 MG: 10 TABLET, FILM COATED ORAL at 08:32

## 2021-12-29 RX ADMIN — OXYBUTYNIN CHLORIDE 5 MG: 5 TABLET ORAL at 21:47

## 2021-12-29 RX ADMIN — HYDROCODONE BITARTRATE AND ACETAMINOPHEN 1 TABLET: 5; 325 TABLET ORAL at 13:37

## 2021-12-29 RX ADMIN — LEVOTHYROXINE SODIUM 150 MCG: 150 TABLET ORAL at 05:17

## 2021-12-29 RX ADMIN — PHENAZOPYRIDINE HYDROCHLORIDE 200 MG: 100 TABLET ORAL at 23:16

## 2021-12-29 RX ADMIN — TRAMADOL HYDROCHLORIDE 50 MG: 50 TABLET, COATED ORAL at 12:30

## 2021-12-29 RX ADMIN — ONDANSETRON 4 MG: 2 INJECTION INTRAMUSCULAR; INTRAVENOUS at 12:30

## 2021-12-29 RX ADMIN — HYDROCODONE BITARTRATE AND ACETAMINOPHEN 1 TABLET: 5; 325 TABLET ORAL at 09:36

## 2021-12-29 RX ADMIN — PHENAZOPYRIDINE HYDROCHLORIDE 200 MG: 100 TABLET ORAL at 13:37

## 2021-12-29 RX ADMIN — OXYBUTYNIN CHLORIDE 5 MG: 5 TABLET ORAL at 09:36

## 2021-12-29 RX ADMIN — ENOXAPARIN SODIUM 40 MG: 100 INJECTION SUBCUTANEOUS at 08:31

## 2021-12-29 RX ADMIN — SODIUM CHLORIDE 25 ML: 9 INJECTION, SOLUTION INTRAVENOUS at 08:40

## 2021-12-29 RX ADMIN — TRAMADOL HYDROCHLORIDE 50 MG: 50 TABLET, COATED ORAL at 16:47

## 2021-12-29 RX ADMIN — PREGABALIN 300 MG: 100 CAPSULE ORAL at 08:31

## 2021-12-29 RX ADMIN — FAMOTIDINE 20 MG: 20 TABLET ORAL at 08:32

## 2021-12-29 RX ADMIN — MORPHINE SULFATE 2 MG: 2 INJECTION, SOLUTION INTRAMUSCULAR; INTRAVENOUS at 03:53

## 2021-12-29 RX ADMIN — PANTOPRAZOLE SODIUM 40 MG: 40 TABLET, DELAYED RELEASE ORAL at 21:47

## 2021-12-29 RX ADMIN — HYDROCODONE BITARTRATE AND ACETAMINOPHEN 1 TABLET: 5; 325 TABLET ORAL at 23:18

## 2021-12-29 RX ADMIN — TRAMADOL HYDROCHLORIDE 50 MG: 50 TABLET, COATED ORAL at 21:47

## 2021-12-29 RX ADMIN — ASPIRIN 81 MG: 81 TABLET, COATED ORAL at 08:31

## 2021-12-29 RX ADMIN — Medication: at 09:39

## 2021-12-29 RX ADMIN — CEFTRIAXONE SODIUM 1000 MG: 1 INJECTION, POWDER, FOR SOLUTION INTRAMUSCULAR; INTRAVENOUS at 08:41

## 2021-12-29 RX ADMIN — TRAMADOL HYDROCHLORIDE 50 MG: 50 TABLET, COATED ORAL at 00:50

## 2021-12-29 ASSESSMENT — PAIN DESCRIPTION - PROGRESSION
CLINICAL_PROGRESSION: NOT CHANGED
CLINICAL_PROGRESSION: GRADUALLY WORSENING
CLINICAL_PROGRESSION: GRADUALLY WORSENING
CLINICAL_PROGRESSION: NOT CHANGED
CLINICAL_PROGRESSION: GRADUALLY WORSENING
CLINICAL_PROGRESSION: GRADUALLY WORSENING
CLINICAL_PROGRESSION: NOT CHANGED

## 2021-12-29 ASSESSMENT — PAIN DESCRIPTION - ONSET
ONSET: PROGRESSIVE
ONSET: ON-GOING
ONSET: PROGRESSIVE
ONSET: ON-GOING
ONSET: ON-GOING
ONSET: PROGRESSIVE
ONSET: ON-GOING

## 2021-12-29 ASSESSMENT — PAIN DESCRIPTION - FREQUENCY
FREQUENCY: CONTINUOUS

## 2021-12-29 ASSESSMENT — PAIN DESCRIPTION - LOCATION
LOCATION: FLANK

## 2021-12-29 ASSESSMENT — PAIN DESCRIPTION - PAIN TYPE
TYPE: ACUTE PAIN

## 2021-12-29 ASSESSMENT — PAIN SCALES - GENERAL
PAINLEVEL_OUTOF10: 7
PAINLEVEL_OUTOF10: 8
PAINLEVEL_OUTOF10: 6
PAINLEVEL_OUTOF10: 9
PAINLEVEL_OUTOF10: 7
PAINLEVEL_OUTOF10: 9
PAINLEVEL_OUTOF10: 6
PAINLEVEL_OUTOF10: 8
PAINLEVEL_OUTOF10: 8
PAINLEVEL_OUTOF10: 4
PAINLEVEL_OUTOF10: 9
PAINLEVEL_OUTOF10: 8
PAINLEVEL_OUTOF10: 6
PAINLEVEL_OUTOF10: 3
PAINLEVEL_OUTOF10: 0
PAINLEVEL_OUTOF10: 5
PAINLEVEL_OUTOF10: 6
PAINLEVEL_OUTOF10: 6

## 2021-12-29 ASSESSMENT — PAIN DESCRIPTION - DESCRIPTORS
DESCRIPTORS: DISCOMFORT
DESCRIPTORS: SHARP;STABBING
DESCRIPTORS: STABBING;SHARP
DESCRIPTORS: SPASM
DESCRIPTORS: SHARP;STABBING
DESCRIPTORS: SPASM
DESCRIPTORS: SHARP;STABBING

## 2021-12-29 ASSESSMENT — PAIN DESCRIPTION - ORIENTATION
ORIENTATION: LEFT

## 2021-12-29 NOTE — PROGRESS NOTES
[facility] [address]   Progress Note  [facility] 0 1 2      Date: 2021   Patient: Varghese Dalton   : 1975   DOA: 2021   MRN: [id number]   ROOM#: [facility]     Admit Date: 2021     Collaborating Urologist on Call at time of admission: Dr. Andie Ramirez  CC: \"My left side hurts\"  Reason for Consult:  Left ureteral stone  POD #1: Cystoscopy and left ureteral stent placement    Subjective:     Pain: mild, nausea and no vomiting,   Bowel Movement/Flatus:   Yes  Voiding: Frequently, incompletely     Pt resting in bed, states she is having urinary frequency and discomfort with voiding.      Objective:    Vitals:    /86   Pulse 73   Temp 98.5 °F (36.9 °C) (Oral)   Resp 18   Ht 5' 3\" (1.6 m)   Wt 195 lb (88.5 kg)   SpO2 97%   BMI 34.54 kg/m²    Temp  Av.7 °F (36.5 °C)  Min: 97.2 °F (36.2 °C)  Max: 98.5 °F (36.9 °C)     Intake/Output Summary (Last 24 hours) at 2021 0925  Last data filed at 2021 0844  Gross per 24 hour   Intake 1400 ml   Output 1100 ml   Net 300 ml       Physical Exam:   General appearance: alert, appears stated age, cooperative, no distress and mildly obese  Head: Normocephalic, without obvious abnormality, atraumatic  Back: `Left CVA tenderness  Abdomen: Soft, non-distended, TTP in LLQ    Labs:   WBC:    Lab Results   Component Value Date    WBC 7.0 2021      Hemoglobin/Hematocrit:    Lab Results   Component Value Date    HGB 10.6 2021    HCT 34.0 2021      BMP:   Lab Results   Component Value Date     2021    K 3.9 2021     2021    CO2 23 2021    BUN 10 2021    CREATININE 0.6 2021    CALCIUM 8.1 2021    GFRAA >60 2021    LABGLOM >60 2021     Urine Culture: <50,000 CFU/ml mixed skin/urogenital alexandru    Imaging:  CT ABDOMEN PELVIS WO CONTRAST Additional Contrast? None    Result Date: 2021  EXAMINATION: CT OF THE ABDOMEN AND PELVIS WITHOUT CONTRAST 12/27/2021 1:55 pm TECHNIQUE: CT of the abdomen and pelvis was performed without the administration of intravenous contrast. Multiplanar reformatted images are provided for review. Dose modulation, iterative reconstruction, and/or weight based adjustment of the mA/kV was utilized to reduce the radiation dose to as low as reasonably achievable. COMPARISON: None. HISTORY: ORDERING SYSTEM PROVIDED HISTORY: flank pain and dysuria TECHNOLOGIST PROVIDED HISTORY: Reason for exam:->flank pain and dysuria Additional Contrast?->None Decision Support Exception - unselect if not a suspected or confirmed emergency medical condition->Emergency Medical Condition (MA) Is the patient pregnant?->No FINDINGS: Lower Chest: Lung bases clear. Organs: Unremarkable liver, spleen, pancreas, and adrenals on this unenhanced study. Status post cholecystectomy. A 0.9 cm stone at the left UPJ causing minimal left-sided hydronephrosis. Additional punctate nonobstructing stones in the left kidney. No right-sided radiopaque urolithiasis. GI/Bowel: No CT evidence of acute appendicitis. Status post gastric bypass surgery. Bowel loops nonobstructed. Pelvis: Prior hysterectomy. No adnexal mass. No radiopaque stone in the incompletely distended urinary bladder. Peritoneum/Retroperitoneum: No free air or free fluid. No adenopathy. Minimal vascular calcification. No abdominal aortic aneurysm Bones/Soft Tissues: A small fat containing periumbilical hernia. Multilevel thoracolumbar spondylosis. Mild osteoarthritis of the bilateral hip joints. A 0.9 cm stone at the left UPJ. Minimal left-sided hydronephrosis. Multiple punctate nonobstructing stones in the left kidney. No right-sided radiopaque urolithiasis. RECOMMENDATIONS: Unavailable     FL LESS THAN 1 HOUR    Result Date: 12/28/2021  Radiology exam is complete. No Radiologist dictation. Please follow up with ordering provider.        Assessment & Plan:      Margarette Hazel is a 48 y.o. year old diabetic female admitted 12/27/2021 for ureteral stone, UTI. 1) Left Ureteral Calculus: POD #1 Cystoscopy and left ureter stent placement. Cloudy urine in bladder with minimal cystitis and thus did not proceed with ureteroscopy. CT a/p 12/27/21: A 0.9 cm stone at the left UPJ. Minimal left-sided hydronephrosis. Multiple punctate nonobstructing stones in the left kidney. Cr 0.6   On Ditropan 5mg TID and Pyridium 200mg TID prn bladder spasms. OK to start B&O suppositories if still uncontrolled. The pt will need outpatient ureteroscopy or ESWL with  stent removal once her urine is clear of infection. 2) UTI: Urine cx <50,000 CFU/ml mixed skin/urogenital alexandru   WBC 7.0, afebrile   On IV Rocephin; recommend discharge on Ceftin 500mg BID x7 days. Will follow. Anticipate discharge home tomorrow. Patient seen and examined, chart reviewed.      Electronically signed by Jacqui Almonte PA-C on 12/29/2021 at 9:25 AM

## 2021-12-29 NOTE — OP NOTE
[address] [place] [address], [facility]                                OPERATIVE REPORT    PATIENT NAME: Renaldo Gonzalez                     :        1975  MED REC NO:   [id number]                          ROOM:       [id number]  ACCOUNT NO:   [de-identified]                           ADMIT DATE: 2021  PROVIDER:     Daniela Montilla MD    DATE OF PROCEDURE:  2021    PREOPERATIVE DIAGNOSES:  1.  A 9 mm left ureteropelvic junction stone with obstruction. 2.  Acute left flank pain and nausea. POSTOPERATIVE DIAGNOSES:  1.  A 9 mm left ureteropelvic junction stone with obstruction. 2.  Acute left flank pain and nausea. PROCEDURE PERFORMED:  Cystoscopy and left ureter stent placement. SURGEON:  Daniela Montilla MD    ANESTHESIA:  General.    ESTIMATED BLOOD LOSS:  Minimal.    COMPLICATIONS:  None. DRAINS PLACED:  4.8 [place] variable left ureter stent. FINDINGS:  1. Cloudy urine in bladder with minimal cystitis. Therefore, did not  proceed with ureteroscopy. 2.  Stone at left UPJ, visible on fluoroscopy. DISPOSITION:  Stable to PACU. INDICATIONS FOR PROCEDURE  The patient is a 47-year-old female with a  history of insulin dependent diabetes, who developed acute left flank  pain several days ago. She was seen in the emergency room and diagnosed  with a 9 mm left renal pelvis stone with obstruction. She had acute  left flank pain and nausea. The patient does have a history of urinary  tract infections. Her urinalysis was positive for leuko esterases and  she did have some acute lower urinary tract symptoms. She is currently  on Rocephin. I discussed the risks, benefits and alternative therapies. I recommended proceeding with at least a stent placement and possible  ureteroscopy if no visible evidence of an infection. She elected to  proceed. OP REPORT:  The patient was on Rocephin.   She had compression boots in  place. She was brought to the operating room, and placed in supine  position. A general anesthetic was administered by the Anesthesia  Service. She was then placed in dorsal lithotomy position and prepped  and draped in sterile fashion after being appropriately padded. A  time-out was performed per protocol. A 21-Zambian cystoscope with 30-degree lens was placed through the  urethra and into the bladder. There was visibly cloudy urine and  minimal cystitis. The bladder was irrigated several times. Due to the  cloudy urine and visible cystitis, I elected not to proceed with  ureteroscopy. I do not have a negative urine culture. A sensor  guidewire was placed into the left collecting system. Her left renal  pelvis stone was visible on fluoroscopy. A 4.8 [place] variable stent  was placed into the left collecting system. It appeared to have  adequate positioning on fluoroscopy. There was minimal blood loss  during this procedure. The bladder was emptied and the patient was  brought to the PACU in stable condition. The patient will continue Rocephin and await her urine culture result. I then recommended proceeding with outpatient ureteroscopy or ESWL with  stent removal.  The stone is visible on  fluoroscopy.         Devonte Daugherty MD    D: 12/28/2021 16:45:46       T: 12/28/2021 16:48:06     [email or web address]  Job#: [id number]     Doc#: [id number]    CC:  Macrina Christiansen MD

## 2021-12-29 NOTE — PROGRESS NOTES
Patient remains having discomfort on left flank and is now unable to void, Luther Resendiz NP aware and states he is ordering CT.

## 2021-12-29 NOTE — PROGRESS NOTES
Patient voices having the urge to urinate but only getting 50-100ml out at a time. Bladder scan PVR done showing 0ml in bladder. Daisy Najera NP aware via perfect serve. Handoff

## 2021-12-29 NOTE — PROGRESS NOTES
Hospitalist Progress Note      Name:  Kai Mcmullen /Age/Sex: 1975  (48 y.o. female)   MRN & CSN:  [id number] & [id number] Admission Date/Time: 2021  3:19 PM   Location:  [address] [place] [address]-A PCP: No primary care provider on file. Hospital Day: 3                                               Attending Physician Dr Kylah Murray and Plan:   Kai Mcmullen is a 48 y.o.  female  who presents with abdominal pain     Left ureteral calculus:   CT abdomen/pelvis ()-0.9 cm stone at the left UPJ, minimal left hydronephrosis. S/p cystoscopy with left retrograde pyelogram/to manipulation and stent placement. PRN pain control increased [place] frequency   Urology following   Pending repeat CT due to continued severe pain   Ditropan     Acute cystitis without hematuria   UA suggestive of infection    Pending culture   IV Rocephin      diabetes mellitus type 1, with chronic complications- diabetic neuropathy    Patient using personal insulin pump   FBSB trends 140s-200    Continue Lyrica     Hypertension-patient on lisinopril, metoprolol succinate    Controlled trends    Hypothyroidism-continue levothyroxine. Last TSH.  0.458 (2021)     Depression-continue sertraline     Obesity- Body mass index is 34.54 kg/m². S/p gastric bypass     Achalasia/GERD-Pepcid, omeprazole     This patient was seen and examined autonomously  A hospitalist attending physician was available for questions/consultation as needed. Diet ADULT DIET; Regular; 4 carb choices (60 gm/meal)   DVT Prophylaxis [] Lovenox, [x]  Heparin, [] SCDs, [] Ambulation   GI Prophylaxis [x] PPI,  [x] H2 Blocker,  [] Carafate,  [] Diet/Tube Feeds   Code Status Full Code     -Patient assessment and plan discussed with supervising physician-  Subjective 2021     Kai Mcmullen is a 48 y.o.  female, who presented with  Flank Pain (Pt to the ED for c/o bilateral flank pain with dysuria and hematuria x2 days.  Pt reports hx of kidney stones. ) and Dysuria  . Patient reports continued severe left-sided flank pain. States previous stents/kidney stones have not been this severe. Expresses concern for kidney function with urinary frequency and small amount of urine output based on intake. Labs and test results reviewed at bedside with patient. Discussed plan of care to increase pain medication if no improvement will reimage-patient agreeable to plan. Ten point ROS reviewed negative, unless as noted above    Objective: Intake/Output Summary (Last 24 hours) at 12/29/2021 1230  Last data filed at 12/29/2021 0844  Gross per 24 hour   Intake 1400 ml   Output 1100 ml   Net 300 ml      Vitals:   Vitals:    12/28/21 1835 12/28/21 2000 12/29/21 0445 12/29/21 0800   BP: 124/78 123/82 128/87 127/86   Pulse: 62 65 83 73   Resp: 16 15 17 18   Temp: 97.2 °F (36.2 °C) 98.1 °F (36.7 °C) 98.2 °F (36.8 °C) 98.5 °F (36.9 °C)   TempSrc: Oral Oral Oral Oral   SpO2:  93% 95% 97%   Weight:       Height:         Physical Exam: 12/29/21       GEN: Awake female, sitting upright in  Chair. in no apparent distress. Appears given age. EYES: No scleral erythema, discharge, or conjunctivitis. HENT: Mucous membranes are moist. External ears and nose appear normal.   NECK: Supple, no apparent thyromegaly or masses. RESP: Symmetric chest movement while on room air. No Respiratory Distress. CARDIO/VASC: RRR-SR on telemetry  MSK: No gross joint deformities. SKIN: Normal coloration, warm, dry. NEURO: Cranial nerves appear grossly intact, normal speech, no lateralizing weakness. PSYCH: Awake, alert, oriented x 4. Affect appropriate.     Medications:   Medications:    oxybutynin  5 mg Oral TID    sodium chloride flush  5-40 mL IntraVENous 2 times per day    enoxaparin  40 mg SubCUTAneous Daily    cefTRIAXone (ROCEPHIN) IV  1,000 mg IntraVENous Q24H    aspirin  81 mg Oral Daily    famotidine  20 mg Oral Daily    levothyroxine  150 mcg Oral Daily    lisinopril  20 mg Oral Daily    pantoprazole  40 mg Oral Nightly    pregabalin  300 mg Oral BID    sertraline  150 mg Oral Daily    atorvastatin  10 mg Oral Daily    topiramate  100 mg Oral Nightly    metoprolol succinate  25 mg Oral Daily      Infusions:    sodium chloride 25 mL (12/29/21 0840)    dextrose      sodium chloride 50 mL/hr at 12/29/21 1147     PRN Meds: HYDROcodone-acetaminophen, 1 tablet, Q4H PRN  sodium chloride flush, 5-40 mL, PRN  sodium chloride, 25 mL, PRN  ondansetron, 4 mg, Q8H PRN   Or  ondansetron, 4 mg, Q6H PRN  polyethylene glycol, 17 g, Daily PRN  acetaminophen, 650 mg, Q6H PRN   Or  acetaminophen, 650 mg, Q6H PRN  glucose, 15 g, PRN  dextrose, 12.5 g, PRN  glucagon (rDNA), 1 mg, PRN  dextrose, 100 mL/hr, PRN  traMADol, 50 mg, Q4H PRN  phenazopyridine, 200 mg, TID PRN  ibuprofen, 400 mg, Q6H PRN        LABS  CBC   Recent Labs     12/27/21  1352 12/29/21  0633   WBC 7.0 7.0   HGB 12.6 10.6*   HCT 39.7 34.0*    216      RENAL  Recent Labs     12/27/21  1352 12/29/21  0633    140   K 3.6 3.9    110   CO2 21 23   BUN 10 10   CREATININE 0.6 0.6       Radiology this visit:  Reviewed. CT ABDOMEN PELVIS WO CONTRAST Additional Contrast? None    Result Date: 12/27/2021  EXAMINATION: CT OF THE ABDOMEN AND PELVIS WITHOUT CONTRAST 12/27/2021 1:55 pm TECHNIQUE: CT of the abdomen and pelvis was performed without the administration of intravenous contrast. Multiplanar reformatted images are provided for review. Dose modulation, iterative reconstruction, and/or weight based adjustment of the mA/kV was utilized to reduce the radiation dose to as low as reasonably achievable. COMPARISON: None.  HISTORY: ORDERING SYSTEM PROVIDED HISTORY: flank pain and dysuria TECHNOLOGIST PROVIDED HISTORY: Reason for exam:->flank pain and dysuria Additional Contrast?->None Decision Support Exception - unselect if not a suspected or confirmed emergency medical condition->Emergency Medical Condition (MA) Is the patient pregnant?->No FINDINGS: Lower Chest: Lung bases clear. Organs: Unremarkable liver, spleen, pancreas, and adrenals on this unenhanced study. Status post cholecystectomy. A 0.9 cm stone at the left UPJ causing minimal left-sided hydronephrosis. Additional punctate nonobstructing stones in the left kidney. No right-sided radiopaque urolithiasis. GI/Bowel: No CT evidence of acute appendicitis. Status post gastric bypass surgery. Bowel loops nonobstructed. Pelvis: Prior hysterectomy. No adnexal mass. No radiopaque stone in the incompletely distended urinary bladder. Peritoneum/Retroperitoneum: No free air or free fluid. No adenopathy. Minimal vascular calcification. No abdominal aortic aneurysm Bones/Soft Tissues: A small fat containing periumbilical hernia. Multilevel thoracolumbar spondylosis. Mild osteoarthritis of the bilateral hip joints. A 0.9 cm stone at the left UPJ. Minimal left-sided hydronephrosis. Multiple punctate nonobstructing stones in the left kidney. No right-sided radiopaque urolithiasis. RECOMMENDATIONS: Unavailable     FL LESS THAN 1 HOUR    Result Date: 12/28/2021  Radiology exam is complete. No Radiologist dictation. Please follow up with ordering provider.          Electronically signed by Roderick Krabbe, APRN - CNP on 12/29/2021 at 12:30 PM

## 2021-12-30 VITALS
TEMPERATURE: 97.8 F | SYSTOLIC BLOOD PRESSURE: 98 MMHG | BODY MASS INDEX: 34.55 KG/M2 | RESPIRATION RATE: 16 BRPM | DIASTOLIC BLOOD PRESSURE: 63 MMHG | HEIGHT: 63 IN | HEART RATE: 60 BPM | OXYGEN SATURATION: 94 % | WEIGHT: 195 LBS

## 2021-12-30 LAB
ANION GAP SERPL CALCULATED.3IONS-SCNC: 7 MMOL/L (ref 4–16)
BASOPHILS ABSOLUTE: 0.1 K/CU MM
BASOPHILS RELATIVE PERCENT: 0.8 % (ref 0–1)
BUN BLDV-MCNC: 12 MG/DL (ref 6–23)
CALCIUM SERPL-MCNC: 7.7 MG/DL (ref 8.3–10.6)
CHLORIDE BLD-SCNC: 111 MMOL/L (ref 99–110)
CO2: 20 MMOL/L (ref 21–32)
CREAT SERPL-MCNC: 0.8 MG/DL (ref 0.6–1.1)
DIFFERENTIAL TYPE: ABNORMAL
EOSINOPHILS ABSOLUTE: 0.3 K/CU MM
EOSINOPHILS RELATIVE PERCENT: 4.6 % (ref 0–3)
GFR AFRICAN AMERICAN: >60 ML/MIN/1.73M2
GFR NON-AFRICAN AMERICAN: >60 ML/MIN/1.73M2
GLUCOSE BLD-MCNC: 122 MG/DL (ref 70–99)
GLUCOSE BLD-MCNC: 130 MG/DL (ref 70–99)
GLUCOSE BLD-MCNC: 192 MG/DL (ref 70–99)
GLUCOSE BLD-MCNC: 81 MG/DL (ref 70–99)
HCT VFR BLD CALC: 31.3 % (ref 37–47)
HEMOGLOBIN: 9.4 GM/DL (ref 12.5–16)
IMMATURE NEUTROPHIL %: 0.3 % (ref 0–0.43)
LACTIC ACID, SEPSIS: 1.2 MMOL/L (ref 0.5–1.9)
LYMPHOCYTES ABSOLUTE: 2.3 K/CU MM
LYMPHOCYTES RELATIVE PERCENT: 31 % (ref 24–44)
MCH RBC QN AUTO: 30 PG (ref 27–31)
MCHC RBC AUTO-ENTMCNC: 30 % (ref 32–36)
MCV RBC AUTO: 100 FL (ref 78–100)
MONOCYTES ABSOLUTE: 0.6 K/CU MM
MONOCYTES RELATIVE PERCENT: 8.7 % (ref 0–4)
NUCLEATED RBC %: 0 %
PDW BLD-RTO: 13.1 % (ref 11.7–14.9)
PLATELET # BLD: 207 K/CU MM (ref 140–440)
PMV BLD AUTO: 12.3 FL (ref 7.5–11.1)
POTASSIUM SERPL-SCNC: 3.8 MMOL/L (ref 3.5–5.1)
RBC # BLD: 3.13 M/CU MM (ref 4.2–5.4)
SEGMENTED NEUTROPHILS ABSOLUTE COUNT: 4 K/CU MM
SEGMENTED NEUTROPHILS RELATIVE PERCENT: 54.6 % (ref 36–66)
SODIUM BLD-SCNC: 138 MMOL/L (ref 135–145)
TOTAL IMMATURE NEUTOROPHIL: 0.02 K/CU MM
TOTAL NUCLEATED RBC: 0 K/CU MM
WBC # BLD: 7.4 K/CU MM (ref 4–10.5)

## 2021-12-30 PROCEDURE — 85025 COMPLETE CBC W/AUTO DIFF WBC: CPT

## 2021-12-30 PROCEDURE — 6370000000 HC RX 637 (ALT 250 FOR IP): Performed by: UROLOGY

## 2021-12-30 PROCEDURE — 82962 GLUCOSE BLOOD TEST: CPT

## 2021-12-30 PROCEDURE — 2580000003 HC RX 258: Performed by: NURSE PRACTITIONER

## 2021-12-30 PROCEDURE — 83605 ASSAY OF LACTIC ACID: CPT

## 2021-12-30 PROCEDURE — 6360000002 HC RX W HCPCS: Performed by: UROLOGY

## 2021-12-30 PROCEDURE — 80048 BASIC METABOLIC PNL TOTAL CA: CPT

## 2021-12-30 PROCEDURE — 6370000000 HC RX 637 (ALT 250 FOR IP): Performed by: NURSE PRACTITIONER

## 2021-12-30 PROCEDURE — 1200000000 HC SEMI PRIVATE

## 2021-12-30 PROCEDURE — 36415 COLL VENOUS BLD VENIPUNCTURE: CPT

## 2021-12-30 PROCEDURE — 2580000003 HC RX 258: Performed by: UROLOGY

## 2021-12-30 PROCEDURE — 6370000000 HC RX 637 (ALT 250 FOR IP): Performed by: PHYSICIAN ASSISTANT

## 2021-12-30 RX ORDER — BISACODYL 10 MG
10 SUPPOSITORY, RECTAL RECTAL ONCE
Status: COMPLETED | OUTPATIENT
Start: 2021-12-30 | End: 2021-12-30

## 2021-12-30 RX ORDER — 0.9 % SODIUM CHLORIDE 0.9 %
1000 INTRAVENOUS SOLUTION INTRAVENOUS ONCE
Status: COMPLETED | OUTPATIENT
Start: 2021-12-30 | End: 2021-12-30

## 2021-12-30 RX ORDER — OXYBUTYNIN CHLORIDE 5 MG/1
5 TABLET ORAL 3 TIMES DAILY
Qty: 90 TABLET | Refills: 3 | Status: SHIPPED
Start: 2021-12-30 | End: 2022-03-04

## 2021-12-30 RX ORDER — HYDROCODONE BITARTRATE AND ACETAMINOPHEN 5; 325 MG/1; MG/1
1 TABLET ORAL EVERY 4 HOURS PRN
Qty: 18 TABLET | Refills: 0 | Status: SHIPPED | OUTPATIENT
Start: 2021-12-30 | End: 2022-01-02

## 2021-12-30 RX ORDER — ONDANSETRON 4 MG/1
4 TABLET, ORALLY DISINTEGRATING ORAL EVERY 8 HOURS PRN
Qty: 30 TABLET | Refills: 0 | Status: SHIPPED | OUTPATIENT
Start: 2021-12-30 | End: 2022-03-28

## 2021-12-30 RX ORDER — CEPHALEXIN 500 MG/1
500 CAPSULE ORAL 2 TIMES DAILY
Qty: 14 CAPSULE | Refills: 0 | Status: SHIPPED | OUTPATIENT
Start: 2021-12-30 | End: 2022-01-06

## 2021-12-30 RX ORDER — BISACODYL 10 MG
10 SUPPOSITORY, RECTAL RECTAL DAILY PRN
Status: DISCONTINUED | OUTPATIENT
Start: 2021-12-30 | End: 2021-12-30 | Stop reason: HOSPADM

## 2021-12-30 RX ADMIN — SERTRALINE HYDROCHLORIDE 150 MG: 100 TABLET ORAL at 08:25

## 2021-12-30 RX ADMIN — ATORVASTATIN CALCIUM 10 MG: 10 TABLET, FILM COATED ORAL at 08:24

## 2021-12-30 RX ADMIN — LEVOTHYROXINE SODIUM 150 MCG: 150 TABLET ORAL at 06:08

## 2021-12-30 RX ADMIN — HYDROCODONE BITARTRATE AND ACETAMINOPHEN 1 TABLET: 5; 325 TABLET ORAL at 14:47

## 2021-12-30 RX ADMIN — ONDANSETRON 4 MG: 2 INJECTION INTRAMUSCULAR; INTRAVENOUS at 06:08

## 2021-12-30 RX ADMIN — PHENAZOPYRIDINE HYDROCHLORIDE 200 MG: 100 TABLET ORAL at 09:49

## 2021-12-30 RX ADMIN — FAMOTIDINE 20 MG: 20 TABLET ORAL at 08:25

## 2021-12-30 RX ADMIN — HYDROCODONE BITARTRATE AND ACETAMINOPHEN 1 TABLET: 5; 325 TABLET ORAL at 10:32

## 2021-12-30 RX ADMIN — ASPIRIN 81 MG: 81 TABLET, COATED ORAL at 08:25

## 2021-12-30 RX ADMIN — CEFTRIAXONE SODIUM 1000 MG: 1 INJECTION, POWDER, FOR SOLUTION INTRAMUSCULAR; INTRAVENOUS at 08:26

## 2021-12-30 RX ADMIN — ONDANSETRON 4 MG: 2 INJECTION INTRAMUSCULAR; INTRAVENOUS at 14:55

## 2021-12-30 RX ADMIN — HYDROCODONE BITARTRATE AND ACETAMINOPHEN 1 TABLET: 5; 325 TABLET ORAL at 06:08

## 2021-12-30 RX ADMIN — OXYBUTYNIN CHLORIDE 5 MG: 5 TABLET ORAL at 08:24

## 2021-12-30 RX ADMIN — OXYBUTYNIN CHLORIDE 5 MG: 5 TABLET ORAL at 13:47

## 2021-12-30 RX ADMIN — SODIUM CHLORIDE 1000 ML: 9 INJECTION, SOLUTION INTRAVENOUS at 02:04

## 2021-12-30 RX ADMIN — LISINOPRIL 20 MG: 20 TABLET ORAL at 08:24

## 2021-12-30 RX ADMIN — PREGABALIN 300 MG: 100 CAPSULE ORAL at 08:24

## 2021-12-30 RX ADMIN — BISACODYL 10 MG: 10 SUPPOSITORY RECTAL at 03:13

## 2021-12-30 RX ADMIN — ENOXAPARIN SODIUM 40 MG: 100 INJECTION SUBCUTANEOUS at 08:25

## 2021-12-30 RX ADMIN — METOPROLOL SUCCINATE 25 MG: 25 TABLET, EXTENDED RELEASE ORAL at 08:24

## 2021-12-30 ASSESSMENT — PAIN DESCRIPTION - LOCATION
LOCATION: FLANK;ABDOMEN
LOCATION: ABDOMEN;FLANK

## 2021-12-30 ASSESSMENT — PAIN SCALES - GENERAL
PAINLEVEL_OUTOF10: 5
PAINLEVEL_OUTOF10: 7
PAINLEVEL_OUTOF10: 5
PAINLEVEL_OUTOF10: 7
PAINLEVEL_OUTOF10: 6
PAINLEVEL_OUTOF10: 4
PAINLEVEL_OUTOF10: 7
PAINLEVEL_OUTOF10: 5

## 2021-12-30 ASSESSMENT — PAIN DESCRIPTION - PAIN TYPE
TYPE: ACUTE PAIN
TYPE: ACUTE PAIN

## 2021-12-30 ASSESSMENT — PAIN DESCRIPTION - ORIENTATION: ORIENTATION: LEFT

## 2021-12-30 NOTE — PROGRESS NOTES
Madyson Mention NP notified pt BP and that she has felt slight dizziness when standing since she has had the stone. He states she is still ok to discharge.

## 2021-12-30 NOTE — PROGRESS NOTES
[facility]   Progress Note  [facility] 0 1 2      Date: 2021   Patient: Stephanie Bernal   : 1975   DOA: 2021   MRN: [id number]   ROOM#: [facility]     Admit Date: 2021     Collaborating Urologist on Call at time of admission: Dr. Elpidio Hanks  CC: \"My left side hurts\"  Reason for Consult:  Left ureteral stone  POD #2: Cystoscopy and left ureteral stent placement    Subjective:     Pain: Mild, mild nausea and no vomiting,   Bowel Movement/Flatus: Passing flatus  Voiding: Frequently    Pt resting in bed, states she is feeling slightly better today.     Objective:    Vitals:    BP (!) 115/58   Pulse 70   Temp 98.2 °F (36.8 °C) (Oral)   Resp 16   Ht 5' 3\" (1.6 m)   Wt 195 lb (88.5 kg)   SpO2 94%   BMI 34.54 kg/m²    Temp  Av.1 °F (36.7 °C)  Min: 98.1 °F (36.7 °C)  Max: 98.2 °F (36.8 °C)     Intake/Output Summary (Last 24 hours) at 2021 0902  Last data filed at 2021 8657  Gross per 24 hour   Intake --   Output 225 ml   Net -225 ml       Physical Exam:   General appearance: alert, appears stated age, cooperative, no distress and mildly obese  Head: Normocephalic, without obvious abnormality, atraumatic  Back: Left CVA tenderness  Abdomen: Soft, non-distended, TTP in LLQ    Labs:   WBC:    Lab Results   Component Value Date    WBC 7.4 2021      Hemoglobin/Hematocrit:    Lab Results   Component Value Date    HGB 9.4 2021    HCT 31.3 2021      BMP:   Lab Results   Component Value Date     2021    K 3.8 2021     2021    CO2 20 2021    BUN 12 2021    CREATININE 0.8 2021    CALCIUM 7.7 2021    GFRAA >60 2021    LABGLOM >60 2021     Urine Culture: <50,000 CFU/ml mixed skin/urogenital alexandru    Imaging:   CT ABDOMEN PELVIS WO CONTRAST Additional Contrast? None    Result Date: 2021  EXAMINATION: CT OF THE ABDOMEN AND PELVIS WITHOUT CONTRAST 2021 10:45 pm TECHNIQUE: CT of the abdomen and pelvis was performed without the administration of intravenous contrast. Multiplanar reformatted images are provided for review. Dose modulation, iterative reconstruction, and/or weight based adjustment of the mA/kV was utilized to reduce the radiation dose to as low as reasonably achievable. COMPARISON: December 27, 2021 HISTORY: ORDERING SYSTEM PROVIDED HISTORY: Significant flank pain post ureteral stenting TECHNOLOGIST PROVIDED HISTORY: Reason for exam:->Significant flank pain post ureteral stenting Additional Contrast?->None Is the patient pregnant?->No Reason for Exam: Significant flank pain post ureteral stenting Additional signs and symptoms: surg. hx; gallbladder,hysterectomy,appendectomy,gastric bypass. Relevant Medical/Surgical History: none FINDINGS: Lower Chest: Clear lung bases. Organs: Status post cholecystectomy. No intra or extrahepatic biliary dilatation. The liver, spleen, pancreas, and adrenal glands demonstrate no acute abnormality. The left kidney demonstrates a double-J ureteral stent in place. Gas within the collecting system is likely iatrogenic. Nonobstructing punctate stones are present. A 9 mm renal pelvic stone is in place. No significant hydronephrosis. The right collecting system is normal. GI/Bowel: Status post Denise-en-Y gastric bypass. Thickening of the distal esophagus is seen. There is mild distention of the small bowel measuring up to 2.9 cm. No discrete transition point. The colon is normal in course and caliber. Pelvis: Normal bladder. Left double-J ureteral stent is in place. Status post hysterectomy. No adnexal masses. Peritoneum/Retroperitoneum: No free fluid or free air. No pathologic lymphadenopathy. Aorta and its branches are normal in course and caliber with minimal atherosclerosis. Bones/Soft Tissues: No acute or aggressive osseous lesion. 1. Interval placement of a left double-J ureteral stent. No hydronephrosis.  A 9 mm stone persists in the left renal pelvis. Punctate stones are also seen in the left kidney. 2. Interval appearance of distended loops of small bowel. No discrete transition point. Findings are favored to represent a focal ileus although a low-grade partial small bowel obstruction remains a differential consideration. 3. Cholecystectomy and hysterectomy. RECOMMENDATIONS: Unavailable     CT ABDOMEN PELVIS WO CONTRAST Additional Contrast? None    Result Date: 12/27/2021  EXAMINATION: CT OF THE ABDOMEN AND PELVIS WITHOUT CONTRAST 12/27/2021 1:55 pm TECHNIQUE: CT of the abdomen and pelvis was performed without the administration of intravenous contrast. Multiplanar reformatted images are provided for review. Dose modulation, iterative reconstruction, and/or weight based adjustment of the mA/kV was utilized to reduce the radiation dose to as low as reasonably achievable. COMPARISON: None. HISTORY: ORDERING SYSTEM PROVIDED HISTORY: flank pain and dysuria TECHNOLOGIST PROVIDED HISTORY: Reason for exam:->flank pain and dysuria Additional Contrast?->None Decision Support Exception - unselect if not a suspected or confirmed emergency medical condition->Emergency Medical Condition (MA) Is the patient pregnant?->No FINDINGS: Lower Chest: Lung bases clear. Organs: Unremarkable liver, spleen, pancreas, and adrenals on this unenhanced study. Status post cholecystectomy. A 0.9 cm stone at the left UPJ causing minimal left-sided hydronephrosis. Additional punctate nonobstructing stones in the left kidney. No right-sided radiopaque urolithiasis. GI/Bowel: No CT evidence of acute appendicitis. Status post gastric bypass surgery. Bowel loops nonobstructed. Pelvis: Prior hysterectomy. No adnexal mass. No radiopaque stone in the incompletely distended urinary bladder. Peritoneum/Retroperitoneum: No free air or free fluid. No adenopathy. Minimal vascular calcification.   No abdominal aortic aneurysm Bones/Soft Tissues: A small fat containing periumbilical hernia. Multilevel thoracolumbar spondylosis. Mild osteoarthritis of the bilateral hip joints. A 0.9 cm stone at the left UPJ. Minimal left-sided hydronephrosis. Multiple punctate nonobstructing stones in the left kidney. No right-sided radiopaque urolithiasis. RECOMMENDATIONS: Unavailable     FL LESS THAN 1 HOUR    Result Date: 12/28/2021  Radiology exam is complete. No Radiologist dictation. Please follow up with ordering provider. Assessment & Plan:      Kai Mcmullen is a 48y.o. year old female admitted 12/27/2021 for ureteral stone, UTI.     1) Left Ureteral Calculus: POD #2 Cystoscopy and left ureter stent placement. Cloudy urine in bladder with minimal cystitis and thus did not proceed with ureteroscopy. CT a/p 12/27/21: A 0.9 cm stone at the left UPJ. Minimal left-sided hydronephrosis. Multiple punctate nonobstructing stones in the left kidney. Cr 0.8              On Ditropan 5mg TID and Pyridium 200mg TID prn bladder spasms. OK to start B&O suppositories if still uncontrolled. The pt will need outpatient ureteroscopy or ESWL with stent removal once her urine is clear of infection. 2) UTI: Urine cx <50,000 CFU/ml mixed skin/urogenital alexandru              WBC 7.4, afebrile              On IV Rocephin; recommend discharge on Ceftin 500mg BID x7 days.     Pt stable from a  standpoint. Will sign off, please call with any questions. We will arrange for a left ureteroscopy/stone manipulation as outpatient in the next few weeks. Patient seen and examined, chart reviewed.      Electronically signed by Ej Jarrell PA-C on 12/30/2021 at 9:02 AM

## 2021-12-30 NOTE — DISCHARGE SUMMARY
Discharge Summary    Name:  Jeremy Baker /Age/Sex: 1975  (48 y.o. female)   MRN & CSN:  [id number] & [id number] Admission Date/Time: 2021  3:19 PM   Attending:  Brandon Mays MD Discharging Provider Alysia Hernandez, APRN - [address] Course:   Jeremy Baker is a 48 y.o.  female  who presents with flank pain     : The patient was initially admitted 2021 for several flank pain and found to have left sided obstructing stone . Hospital course as outlined below     Left ureteral calculus  Obstructive uropathy               CT abdomen/pelvis ()-0.9 cm stone at the left UPJ, minimal left hydronephrosis. CT (2021)Interval placement of a left double-J ureteral stent.  No hydronephrosis. A 9 mm stone persists in the left renal pelvis.  Punctate stones are also seen in the left kidney. 2. Interval appearance of distended loops of small bowel.  No discrete transition point.  Findings are favored to represent a focal ileus although a low-grade partial small bowel obstruction remains a differential   consideration. S/p cystoscopy with left retrograde pyelogram/stone manipulation and stent placement. PRN [place] rx              Urology cleared for discharge with OP follow up and               Ditropan                Ileus-suspected as noted per CT. Normal bowel sounds and passing flatus but no BM              Serial abdominal exams benign     Acute cystitis without hematuria              UA suggestive of infection               Culture no growth to date              Keflex on discharge      Diabetes Mellitus type 1, with chronic complications- diabetic neuropathy               Patient using personal insulin pump              FBSB trends 140s-200                         Continue Lyrica     Anemia-chronic H/H 9.4/21. 3              Trend labs     Hypertension-patient on lisinopril, metoprolol succinate              Controlled trends     Hypothyroidism-continue levothyroxine. Last TSH.  0.458 (6/2021)     Depression-continue sertraline     Obesity- Body mass index is 34.54 kg/m². S/p gastric bypass      Achalasia/GERD-Pepcid, omeprazole              As needed GI cocktail    The patient expressed appropriate understanding of and agreement with the discharge recommendations, medications, and plan.      Consults this admission:  IP CONSULT TO Omero Alan 141 TO HOSPITALIST    Discharge Instruction:   Follow up appointments: Urology   Primary care physician:  within 2 weeks    Diet:  regular diet   Activity: activity as tolerated  Disposition: Discharged to:   [x]Home, [][facility], []SNF, []Acute Rehab, []Hospice   Condition on discharge: Stable    Discharge Medications:        Medication List        ASK your doctor about these medications      aspirin 81 MG EC tablet     famotidine 20 MG tablet  Commonly known as: PEPCID     Insulin Pump - Insulin regular     levothyroxine 150 MCG tablet  Commonly known as: SYNTHROID     lisinopril 20 MG tablet  Commonly known as: PRINIVIL;ZESTRIL     meloxicam 7.5 MG tablet  Commonly known as: Mobic  Take 2 tablets by mouth daily for 10 days     metoprolol succinate 25 MG extended release tablet  Commonly known as: TOPROL XL     omeprazole 20 MG delayed release capsule  Commonly known as: PRILOSEC     pregabalin 150 MG capsule  Commonly known as: LYRICA     promethazine 25 MG tablet  Commonly known as: PHENERGAN     Semaglutide (1 MG/DOSE) 2 MG/1.5ML Sopn     sertraline 100 MG tablet  Commonly known as: ZOLOFT     simvastatin 20 MG tablet  Commonly known as: ZOCOR     topiramate 100 MG tablet  Commonly known as: TOPAMAX              Objective Findings at Discharge:     Vitals:    12/29/21 0800 12/29/21 1623 12/29/21 2130 12/30/21 0812   BP: 127/86 108/63 (!) 91/54 (!) 115/58   Pulse: 73 65 68 70   Resp: 18 20 20 16   Temp: 98.5 °F (36.9 °C) 98.1 °F (36.7 °C) 98.1 °F (36.7 °C) 98.2 °F (36.8 °C)   TempSrc: Oral Oral Oral Oral   SpO2: 97% 92%  94%   Weight:       Height:                  Physical Exam: 12/30/21     Gen: Bandera Tarik, alert, cooperative, no apparent distress obese body habitus  Head/Eyes:  Normocephalic atraumatic, EOMI   NECK:   symmetrical, trachea midline  LUNGS: Normal Effort/ symmetry movement   CARDIOVASCULAR:  Normal rate   ABDOMEN: Non tender, non distended, no HSM noted. Normal bowel sounds  MUSCULOSKELETAL: no gross deformities  NEUROLOGIC: Alert and Oriented,  Cranial nerves II-XII are grossly intact. SKIN:  no bruising or bleeding, normal skin color,  no redness    Data:     Laboratory this visit:  Reviewed  Recent Labs     12/27/21  1352 12/29/21  0633 12/30/21  0250   WBC 7.0 7.0 7.4   HGB 12.6 10.6* 9.4*   HCT 39.7 34.0* 31.3*    216 207      Recent Labs     12/27/21  1352 12/29/21  0633 12/30/21  0250    140 138   K 3.6 3.9 3.8    110 111*   CO2 21 23 20*   BUN 10 10 12   CREATININE 0.6 0.6 0.8     Radiology this visit:  Reviewed. CT ABDOMEN PELVIS WO CONTRAST Additional Contrast? None    Result Date: 12/30/2021  EXAMINATION: CT OF THE ABDOMEN AND PELVIS WITHOUT CONTRAST 12/29/2021 10:45 pm TECHNIQUE: CT of the abdomen and pelvis was performed without the administration of intravenous contrast. Multiplanar reformatted images are provided for review. Dose modulation, iterative reconstruction, and/or weight based adjustment of the mA/kV was utilized to reduce the radiation dose to as low as reasonably achievable. COMPARISON: December 27, 2021 HISTORY: ORDERING SYSTEM PROVIDED HISTORY: Significant flank pain post ureteral stenting TECHNOLOGIST PROVIDED HISTORY: Reason for exam:->Significant flank pain post ureteral stenting Additional Contrast?->None Is the patient pregnant?->No Reason for Exam: Significant flank pain post ureteral stenting Additional signs and symptoms: surg. hx; gallbladder,hysterectomy,appendectomy,gastric bypass.  Relevant Medical/Surgical History: none FINDINGS: Lower Chest: Clear lung bases. Organs: Status post cholecystectomy. No intra or extrahepatic biliary dilatation. The liver, spleen, pancreas, and adrenal glands demonstrate no acute abnormality. The left kidney demonstrates a double-J ureteral stent in place. Gas within the collecting system is likely iatrogenic. Nonobstructing punctate stones are present. A 9 mm renal pelvic stone is in place. No significant hydronephrosis. The right collecting system is normal. GI/Bowel: Status post Denise-en-Y gastric bypass. Thickening of the distal esophagus is seen. There is mild distention of the small bowel measuring up to 2.9 cm. No discrete transition point. The colon is normal in course and caliber. Pelvis: Normal bladder. Left double-J ureteral stent is in place. Status post hysterectomy. No adnexal masses. Peritoneum/Retroperitoneum: No free fluid or free air. No pathologic lymphadenopathy. Aorta and its branches are normal in course and caliber with minimal atherosclerosis. Bones/Soft Tissues: No acute or aggressive osseous lesion. 1. Interval placement of a left double-J ureteral stent. No hydronephrosis. A 9 mm stone persists in the left renal pelvis. Punctate stones are also seen in the left kidney. 2. Interval appearance of distended loops of small bowel. No discrete transition point. Findings are favored to represent a focal ileus although a low-grade partial small bowel obstruction remains a differential consideration. 3. Cholecystectomy and hysterectomy. RECOMMENDATIONS: Unavailable     CT ABDOMEN PELVIS WO CONTRAST Additional Contrast? None    Result Date: 12/27/2021  EXAMINATION: CT OF THE ABDOMEN AND PELVIS WITHOUT CONTRAST 12/27/2021 1:55 pm TECHNIQUE: CT of the abdomen and pelvis was performed without the administration of intravenous contrast. Multiplanar reformatted images are provided for review.  Dose modulation, iterative reconstruction, and/or weight based adjustment of the mA/kV was utilized to reduce the radiation dose to as low as reasonably achievable. COMPARISON: None. HISTORY: ORDERING SYSTEM PROVIDED HISTORY: flank pain and dysuria TECHNOLOGIST PROVIDED HISTORY: Reason for exam:->flank pain and dysuria Additional Contrast?->None Decision Support Exception - unselect if not a suspected or confirmed emergency medical condition->Emergency Medical Condition (MA) Is the patient pregnant?->No FINDINGS: Lower Chest: Lung bases clear. Organs: Unremarkable liver, spleen, pancreas, and adrenals on this unenhanced study. Status post cholecystectomy. A 0.9 cm stone at the left UPJ causing minimal left-sided hydronephrosis. Additional punctate nonobstructing stones in the left kidney. No right-sided radiopaque urolithiasis. GI/Bowel: No CT evidence of acute appendicitis. Status post gastric bypass surgery. Bowel loops nonobstructed. Pelvis: Prior hysterectomy. No adnexal mass. No radiopaque stone in the incompletely distended urinary bladder. Peritoneum/Retroperitoneum: No free air or free fluid. No adenopathy. Minimal vascular calcification. No abdominal aortic aneurysm Bones/Soft Tissues: A small fat containing periumbilical hernia. Multilevel thoracolumbar spondylosis. Mild osteoarthritis of the bilateral hip joints. A 0.9 cm stone at the left UPJ. Minimal left-sided hydronephrosis. Multiple punctate nonobstructing stones in the left kidney. No right-sided radiopaque urolithiasis. RECOMMENDATIONS: Unavailable     FL LESS THAN 1 HOUR    Result Date: 12/28/2021  Radiology exam is complete. No Radiologist dictation. Please follow up with ordering provider.          Discharge Time of < 30 minutes    Electronically signed by JULIA Good CNP on 12/30/2021 at 11:08 AM

## 2021-12-30 NOTE — PROGRESS NOTES
BS of 45. Pt A/Ox4, speaking in full clear sentences. Pt provided Oj, crackers and peanut butter. Will reassess BS per protocol.

## 2021-12-30 NOTE — PROGRESS NOTES
Hospitalist Progress Note      Name:  Varghese Dalton /Age/Sex: 1975  (48 y.o. female)   MRN & CSN:  [id number] & [id number] Admission Date/Time: 2021  3:19 PM   [facility]  [facility] PCP: No primary care provider on file. Hospital Day: 4                                               Attending Physician Dr Armijo Man and Plan:   Varghese Dalton is a 48 y.o.  female  who presents with abdominal pain     Left ureteral calculus:   CT abdomen/pelvis ()-0.9 cm stone at the left UPJ, minimal left hydronephrosis. CT (2021)Interval placement of a left double-J ureteral stent.  No hydronephrosis. A 9 mm stone persists in the left renal pelvis.  Punctate stones are also seen in the left kidney. 2. Interval appearance of distended loops of small bowel.  No discrete transition point.  Findings are favored to represent a focal ileus although a low-grade partial small bowel obstruction remains a differential   consideration. S/p cystoscopy with left retrograde pyelogram/stone manipulation and stent placement. PRN pain control   Urology following   Ditropan     Ileus-suspected as noted per CT. Normal bowel sounds and passing flatus but no BM   Serial abdominal exams     Acute cystitis without hematuria   UA suggestive of infection    Culture no growth to date   IV Rocephin      Diabetes Mellitus type 1, with chronic complications- diabetic neuropathy    Patient using personal insulin pump   FBSB trends 140s-200    Continue Lyrica    Anemia-chronic H/H 9.4/21. 3    Trend labs    Hypertension-patient on lisinopril, metoprolol succinate    Controlled trends    Hypothyroidism-continue levothyroxine. Last TSH.  0.458 (2021)     Depression-continue sertraline     Obesity- Body mass index is 34.54 kg/m². S/p gastric bypass     Achalasia/GERD-Pepcid, omeprazole    As needed GI cocktail      Diet ADULT DIET;  Regular; 4 carb choices (60 gm/meal)   DVT Prophylaxis [] Lovenox, [x]  Heparin, [] SCDs, [] Ambulation   GI Prophylaxis [x] PPI,  [x] H2 Blocker,  [] Carafate,  [] Diet/Tube Feeds   Code Status Full Code     -Patient assessment and plan discussed with supervising physician-  Subjective 12/30/2021     Jeremy Baker is a 48 y.o.  female, who presented with  Flank Pain (Pt to the ED for c/o bilateral flank pain with dysuria and hematuria x2 days. Pt reports hx of kidney stones. ) and Dysuria  . Patient reports improvement of pain. Urine output is better after fluid overnight. Continue nausea. Discussed CT findings with patient concern for ileus/SBO. Verbalized no bowel movement but passing flatus. Discussed discharge planning. Patient expressed readiness to go home. We will see how she tolerates p.o. intake and make determination after lunch    Ten point ROS reviewed negative, unless as noted above    Objective: Intake/Output Summary (Last 24 hours) at 12/30/2021 0937  Last data filed at 12/30/2021 0811  Gross per 24 hour   Intake --   Output 225 ml   Net -225 ml      Vitals:   Vitals:    12/29/21 0800 12/29/21 1623 12/29/21 2130 12/30/21 0812   BP: 127/86 108/63 (!) 91/54 (!) 115/58   Pulse: 73 65 68 70   Resp: 18 20 20 16   Temp: 98.5 °F (36.9 °C) 98.1 °F (36.7 °C) 98.1 °F (36.7 °C) 98.2 °F (36.8 °C)   TempSrc: Oral Oral Oral Oral   SpO2: 97% 92%  94%   Weight:       Height:         Physical Exam: 12/30/21       Gen:  awake, alert, cooperative, no apparent distress obese body habitus  Head/Eyes:  Normocephalic atraumatic, EOMI   NECK:   symmetrical, trachea midline  LUNGS: Normal Effort/ symmetry movement   CARDIOVASCULAR:  Normal rate   ABDOMEN: Non tender, non distended, no HSM noted. Normal bowel sounds  MUSCULOSKELETAL: no gross deformities  NEUROLOGIC: Alert and Oriented,  Cranial nerves II-XII are grossly intact.    SKIN:  no bruising or bleeding, normal skin color,  no redness     Medications:   Medications:    oxybutynin  5 mg Oral TID    sodium chloride flush  5-40 mL IntraVENous 2 times per day    enoxaparin  40 mg SubCUTAneous Daily    cefTRIAXone (ROCEPHIN) IV  1,000 mg IntraVENous Q24H    aspirin  81 mg Oral Daily    famotidine  20 mg Oral Daily    levothyroxine  150 mcg Oral Daily    lisinopril  20 mg Oral Daily    pantoprazole  40 mg Oral Nightly    pregabalin  300 mg Oral BID    sertraline  150 mg Oral Daily    atorvastatin  10 mg Oral Daily    topiramate  100 mg Oral Nightly    metoprolol succinate  25 mg Oral Daily      Infusions:    sodium chloride 25 mL (12/29/21 0840)    dextrose      sodium chloride 100 mL/hr at 12/30/21 0204     PRN Meds: bisacodyl, 10 mg, Daily PRN  HYDROcodone-acetaminophen, 1 tablet, Q4H PRN  sodium chloride flush, 5-40 mL, PRN  sodium chloride, 25 mL, PRN  ondansetron, 4 mg, Q8H PRN   Or  ondansetron, 4 mg, Q6H PRN  polyethylene glycol, 17 g, Daily PRN  acetaminophen, 650 mg, Q6H PRN   Or  acetaminophen, 650 mg, Q6H PRN  glucose, 15 g, PRN  dextrose, 12.5 g, PRN  glucagon (rDNA), 1 mg, PRN  dextrose, 100 mL/hr, PRN  traMADol, 50 mg, Q4H PRN  phenazopyridine, 200 mg, TID PRN  ibuprofen, 400 mg, Q6H PRN        LABS  CBC   Recent Labs     12/27/21  1352 12/29/21  0633 12/30/21  0250   WBC 7.0 7.0 7.4   HGB 12.6 10.6* 9.4*   HCT 39.7 34.0* 31.3*    216 207      RENAL  Recent Labs     12/27/21  1352 12/29/21  0633 12/30/21  0250    140 138   K 3.6 3.9 3.8    110 111*   CO2 21 23 20*   BUN 10 10 12   CREATININE 0.6 0.6 0.8       Radiology this visit:  Reviewed. CT ABDOMEN PELVIS WO CONTRAST Additional Contrast? None    Result Date: 12/27/2021  EXAMINATION: CT OF THE ABDOMEN AND PELVIS WITHOUT CONTRAST 12/27/2021 1:55 pm TECHNIQUE: CT of the abdomen and pelvis was performed without the administration of intravenous contrast. Multiplanar reformatted images are provided for review.  Dose modulation, iterative reconstruction, and/or weight based adjustment of the mA/kV was utilized to reduce the radiation dose to as low as reasonably achievable. COMPARISON: None. HISTORY: ORDERING SYSTEM PROVIDED HISTORY: flank pain and dysuria TECHNOLOGIST PROVIDED HISTORY: Reason for exam:->flank pain and dysuria Additional Contrast?->None Decision Support Exception - unselect if not a suspected or confirmed emergency medical condition->Emergency Medical Condition (MA) Is the patient pregnant?->No FINDINGS: Lower Chest: Lung bases clear. Organs: Unremarkable liver, spleen, pancreas, and adrenals on this unenhanced study. Status post cholecystectomy. A 0.9 cm stone at the left UPJ causing minimal left-sided hydronephrosis. Additional punctate nonobstructing stones in the left kidney. No right-sided radiopaque urolithiasis. GI/Bowel: No CT evidence of acute appendicitis. Status post gastric bypass surgery. Bowel loops nonobstructed. Pelvis: Prior hysterectomy. No adnexal mass. No radiopaque stone in the incompletely distended urinary bladder. Peritoneum/Retroperitoneum: No free air or free fluid. No adenopathy. Minimal vascular calcification. No abdominal aortic aneurysm Bones/Soft Tissues: A small fat containing periumbilical hernia. Multilevel thoracolumbar spondylosis. Mild osteoarthritis of the bilateral hip joints. A 0.9 cm stone at the left UPJ. Minimal left-sided hydronephrosis. Multiple punctate nonobstructing stones in the left kidney. No right-sided radiopaque urolithiasis. RECOMMENDATIONS: Unavailable     FL LESS THAN 1 HOUR    Result Date: 12/28/2021  Radiology exam is complete. No Radiologist dictation. Please follow up with ordering provider.          Electronically signed by JULIA Stubbs CNP on 12/30/2021 at 9:37 AM

## 2021-12-30 NOTE — PROGRESS NOTES
BS 55. Pt still requesting for OJ, and states \"It is normally slow to come back up. \" Pt ambulatory to bathroom with standby assist. CT called and transportation set up for Pt to go to CT.

## 2021-12-30 NOTE — PROGRESS NOTES
. Interval placement of a left double-J ureteral stent.  No hydronephrosis. A 9 mm stone persists in the left renal pelvis.  Punctate stones are also   seen in the left kidney. 2. Interval appearance of distended loops of small bowel.  No discrete   transition point.  Findings are favored to represent a focal ileus although a   low-grade partial small bowel obstruction remains a differential   consideration. 3. Cholecystectomy and hysterectomy. -Reports patient has poor urine output and bladder scan with insignificant amount of urine.  -Abdomen is benign. Patient reports passing gas. Last bowel movement was 2 days ago which patient states is her normal.  She had one episode of nausea and got Zofran for it. Plan:  -We will give a liter bolus normal saline. Increase maintenance fluids from 50 cc/h to 100 cc/h. -Give a suppository. -Monitor if abdominal symptoms worsens consider inserting NG tube and consulting general surgery.

## 2021-12-30 NOTE — PROGRESS NOTES
Reviewed discharge instructions with patient. Discussed medications, reasons to return, care for diagnosis at home, and follow-up care. Pt verbalized understanding and stated no further questions. Pt AOx4 at discharge. No LDA at discharge. Pt has all belongings at discharge including phone, clothes, and bag. Pt waiting on ride at this time.

## 2021-12-30 NOTE — PROGRESS NOTES
Report given by Barry Aguilar. Pt states discomfort of 6 out of 10, but other wise is resting comfortably. Pt updated on current plan of care and has no other needs at this time. A/Ox4, speaking in full, clear sentences. Per sIadora Pierre RN, CT never contacted RN. CT scan was ordered at 1200. RN to contact CT to see is available to for Pt. Bed placed at lowest position, locked, and call light in place.

## 2022-01-03 ENCOUNTER — CARE COORDINATION (OUTPATIENT)
Dept: OTHER | Facility: CLINIC | Age: 47
End: 2022-01-03

## 2022-01-03 NOTE — CARE COORDINATION
Care Transitions Outreach Attempt    Call within 2 business days of discharge: Yes   Attempted to reach patient for transitions of care follow up. Unable to reach patient. ACM left HIPAA compliant voicemail message with request for return call. ACM will continue to follow. Patient: Georgianna Goldberg Patient : 1975 MRN: [id number]    Last Discharge [facility]       Complaint Diagnosis Description Type Department Provider    21 Flank Pain; Dysuria Ureterolithiasis . .. ED to Hosp-Admission (Discharged) (TRANSFER) Virginie Grayson MD; Mukesh Muñoz. .. Was this an external facility discharge? No Discharge Facility: [address] [place] [address]    Noted following upcoming appointments from discharge chart review:   [facility] follow up appointment(s): No future appointments. Non-[facility] follow up appointment(s): Will assess with successful outreach    ROSA Greenfield RN  Associate Care Manager  Phone: [phone]  Email: [email or web address]
Unknown if ever smoked

## 2022-01-04 ENCOUNTER — CARE COORDINATION (OUTPATIENT)
Dept: OTHER | Facility: CLINIC | Age: 47
End: 2022-01-04

## 2022-01-04 NOTE — CARE COORDINATION
Care Transitions Outreach Attempt    Call within 2 business days of discharge: Yes   ACM attempted 2nd outreach to reach patient for introduction to Associate Care Management. HIPAA compliant message left requesting a return phone call at patients convenience. Unable to Reach Letter sent to patient via [employer]. Will continue to outreach patient. Patient: Patricio Ballard Patient : 1975 MRN: [id number]    Last Discharge [facility]       Complaint Diagnosis Description Type Department Provider    21 Flank Pain; Dysuria Ureterolithiasis . .. ED to Hosp-Admission (Discharged) (TRANSFER) Aretha López MD; Carl Burleson. ..        Ulysses Rous, MSN RN  Associate Care Manager  Phone: [phone]  Email: [email or web address]

## 2022-01-05 ENCOUNTER — CARE COORDINATION (OUTPATIENT)
Dept: OTHER | Facility: CLINIC | Age: 47
End: 2022-01-05

## 2022-01-05 ENCOUNTER — HOSPITAL ENCOUNTER (OUTPATIENT)
Age: 47
Discharge: HOME | End: 2022-01-05
Payer: COMMERCIAL

## 2022-01-05 DIAGNOSIS — N20.1 URETEROLITHIASIS: Primary | ICD-10-CM

## 2022-01-05 PROCEDURE — 87086 URINE CULTURE/COLONY COUNT: CPT

## 2022-01-05 PROCEDURE — U0003 INFECTIOUS AGENT DETECTION BY NUCLEIC ACID (DNA OR RNA); SEVERE ACUTE RESPIRATORY SYNDROME CORONAVIRUS 2 (SARS-COV-2) (CORONAVIRUS DISEASE [COVID-19]), AMPLIFIED PROBE TECHNIQUE, MAKING USE OF HIGH THROUGHPUT TECHNOLOGIES AS DESCRIBED BY CMS-2020-01-R: HCPCS

## 2022-01-05 PROCEDURE — U0005 INFEC AGEN DETEC AMPLI PROBE: HCPCS

## 2022-01-05 RX ORDER — HYDROCODONE BITARTRATE AND ACETAMINOPHEN 5; 325 MG/1; MG/1
1 TABLET ORAL
Status: ON HOLD | COMMUNITY
End: 2022-01-07 | Stop reason: HOSPADM

## 2022-01-05 RX ORDER — IBUPROFEN 200 MG
600 TABLET ORAL
COMMUNITY
End: 2022-03-01

## 2022-01-05 NOTE — CARE COORDINATION
[facility] Transitions Follow Up Call    2022    Patient: Anuradha Bhagat  Patient : 1975   MRN: [id number]  Reason for Admission: Left ureteral calculus, Obstructive uropathy, Acute cystitis  Discharge Date: 21 RARS: Readmission Risk Score: 11 ( )         Spoke with: Anuradha Bhagat, patient    Care Transitions Subsequent and Final Call    Subsequent and Final Calls  Do you have any ongoing symptoms?: Yes  Onset of Patient-reported symptoms: In the past 7 days  Patient-reported symptoms: Pain, Other  Interventions for patient-reported symptoms: Notified PCP/Physician  Have your medications changed?: Yes  Patient Reports: states still taking Norco PRN (when not at work) and OTC Azo PRN  Do you have any questions related to your medications?: No  Do you currently have any active services?: No  Do you have any needs or concerns that I can assist you with?: Yes  Patient-reported Needs or Concerns: needs to have surgery scheduled to have ureteral stone removed  Identified Barriers: Stress, Time Constraints  Care Transitions Interventions  Other Interventions:         Transitions of Care Initial Call    Was this an external facility discharge? No Discharge Facility: [address] [place] [address] to be reviewed by the provider   Additional needs identified to be addressed with provider: Yes  uncontrolled pain, need to get surgery scheduled for ureteral stone removal             Method of communication with provider : phone      Advance Care Planning:   Does patient have an Advance Directive: not addressed this date due to patient with time constraints. Was this a readmission?  No  Patient stated reason for admission: urinary stone  Patients top risk factors for readmission: medical condition-patient still with uncontrolled pain related to ureteral stone that needs to be surgically removed  time constraints (patient has returned to work and needs to have surgery done on scheduled day off as she states she has no PTO)    Care Transition Nurse (CTN) contacted the patient by telephone to perform post hospital discharge assessment. Verified name and  with patient as identifiers. Provided introduction to self, and explanation of the CTN role. Patient states still with the stone and stent which are causing pain. States still needs surgery for stone removal and has called Dr. Arthor Angelucci office but no return call received. ACM offered to call Dr. Arthor Angelucci office for patient re: surgery and pain. Patient accepted offer. ACM called and left message for Dr. Arthor Angelucci assistant; left call back phone numbers for ACM and patient with request for return call. 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 Hussein Kitchen at work; states she plans to talk to him about getting surgery scheduled. Patient states concerns regarding surgery as she has not PTO and will have to schedule surgery on previously scheduled day off. States she is off work this Friday and next Monday and was originally hoping to have surgery on Friday. States she cannot afford to take unpaid leave from work. Patient reports taking Ditropan as prescribed; states has 2 days remaining of Keflex RX. Patient reports taking OTC Azo and states having some hematuria although unable to tell amount due to Azo. States taking OTC Advil PRN (600 mg Q 6-8 hours while working) and taking Norco when at home. Reports has 3 Norco remaining from RX. ACM requested refill Norco and RX for Pyridium with voicemail left for Dr. Arthor Angelucci assistant this date. Patient states no longer taking Tylenol as it was not controlling the pain. ACM unable to complete entire assessment due to patient arrived at work while speaking to [employer]. Agreeable to follow up calls from [employer]. Patient provided ACM with her work phone number ([phone]) with consent given to call patient at work.  Patient also gave consent to leave detailed voicemail message on personal phone number. CTN reviewed discharge instructions, medical action plan and red flags with patient who verbalized understanding. Patient given an opportunity to ask questions and does not have any further questions or concerns at this time. Were discharge instructions available to patient? Yes. Reviewed appropriate site of care based on symptoms and resources available to patient including: Specialist, Benefits related nurse triage line and [employer] Mess[employer]. The patient agrees to contact the PCP office for questions related to their healthcare. Medication reconciliation was performed with patient, who verbalizes understanding of administration of home medications. Advised obtaining a 90-day supply of all daily and as-needed medications. Covid Risk Education     Educated patient about risk for severe COVID-19 due to risk factors according to CDC guidelines. ACM reviewed discharge instructions, medical action plan and red flag symptoms with the patient who verbalized understanding. Discussed COVID vaccination status: No. Education provided on COVID-19 vaccination as appropriate. Discussed exposure protocols and quarantine with CDC Guidelines. Patient was given an opportunity to verbalize any questions and concerns and agrees to contact ACM or health care provider for questions related to their healthcare. Reviewed and educated patient on any new and changed medications related to discharge diagnosis. Was patient discharged with a pulse oximeter? No Discussed and confirmed pulse oximeter discharge instructions and when to notify provider or seek emergency care. ACM provided contact information. Plan for follow-up call in 1-2 days based on severity of symptoms and risk factors. Plan for next call: symptom management-urinary pain, hematuria  surgery scheduling for stent removal    Goals      Conditions and Symptoms      I will schedule office visits, as directed by my provider.   I will keep my appointment or reschedule if I have to cancel. I will notify my provider of any barriers to my plan of care. I will notify my provider of any symptoms that indicate a worsening of my condition. Barriers: stress and time constraints  Plan for overcoming my barriers: I will work with my ACM to overcome barriers. Confidence: 9/10  Anticipated Goal Completion Date: 1/30/2022 1/5/2022: Patient states she has called Dr. Kelley Ji office and left messages but no return call received. States she still needs another surgery to remove ureteral stone. ACM offered to call office for patient; patient accepted offer. Follow Up  No future appointments. ROSA Price RN  Associate Care Manager  Phone: [phone]  Email: [email or web address]

## 2022-01-06 ENCOUNTER — ANESTHESIA EVENT (OUTPATIENT)
Dept: OPERATING ROOM | Age: 47
End: 2022-01-06
Payer: COMMERCIAL

## 2022-01-06 LAB
CULTURE: NORMAL
Lab: NORMAL
SARS-COV-2: NOT DETECTED
SOURCE: NORMAL
SPECIMEN: NORMAL

## 2022-01-06 NOTE — ANESTHESIA PRE PROCEDURE
Department of Anesthesiology  Preprocedure Note       Name:  Napoleon Harris   Age:  48 y.o.  :  1975                                          MRN:  [id number]         Date:  2022      Surgeon: Talon Locke):  Virgen Connolly MD    Procedure: Procedure(s):  LEFT CYSTOSCOPY URETEROSCOPY RETROGRADE PYELOGRAM STONE MANIPULATION WITH HOLIUM LASER LITHOTRIPSY  CYSTOSCOPY RETROGRADE PYELOGRAM URETERAL STENT REMOVAL    Medications prior to admission:   Prior to Admission medications    Medication Sig Start Date End Date Taking? Authorizing Provider   ibuprofen (ADVIL;MOTRIN) 200 MG tablet Take 600 mg by mouth every 6-8 hours as needed for Pain    Historical Provider, MD   HYDROcodone-acetaminophen (NORCO) 5-325 MG per tablet Take 1 tablet by mouth every 4-6 hours as needed for Pain. Historical Provider, MD   Phenazopyridine HCl (AZO TABS PO) Take by mouth as needed (urinary pain) Taking OTC    Historical Provider, MD   cephALEXin (KEFLEX) 500 MG capsule Take 1 capsule by mouth 2 times daily for 7 days 21  JULIA Perkins CNP   oxybutynin (DITROPAN) 5 MG tablet Take 1 tablet by mouth 3 times daily 21   JULIA Perkins CNP   ondansetron (ZOFRAN-ODT) 4 MG disintegrating tablet Take 1 tablet by mouth every 8 hours as needed for Nausea or Vomiting 21   JULIA Perkins CNP   metoprolol succinate (TOPROL XL) 25 MG extended release tablet Take 25 mg by mouth daily    Historical Provider, MD   Semaglutide, 1 MG/DOSE, 2 MG/1.5ML SOPN Inject into the skin once a week     Historical Provider, MD   levothyroxine (SYNTHROID) 150 MCG tablet Take 150 mcg by mouth Daily    Historical Provider, MD   lisinopril (PRINIVIL;ZESTRIL) 20 MG tablet Take 20 mg by mouth daily    Historical Provider, MD   pregabalin (LYRICA) 150 MG capsule Take 300 mg by mouth 2 times daily.     Historical Provider, MD   simvastatin (ZOCOR) 20 MG tablet Take 20 mg by mouth nightly    Historical Provider, MD aspirin 81 MG EC tablet Take 81 mg by mouth daily    Historical Provider, MD   topiramate (TOPAMAX) 100 MG tablet Take 100 mg by mouth daily    Historical Provider, MD   omeprazole (PRILOSEC) 20 MG delayed release capsule Take 40 mg by mouth nightly    Historical Provider, MD   famotidine (PEPCID) 20 MG tablet Take 20 mg by mouth daily    Historical Provider, MD   Insulin Pump - Insulin regular Inject into the skin continuous Insulin-to-Carb Ratio (ICR): Insulin Sensitivity Factor (ISF):   Target Blood Glucose: Bolus Frequency:   Humalog    Historical Provider, MD   sertraline (ZOLOFT) 100 MG tablet Take 150 mg by mouth daily    Historical Provider, MD   promethazine (PHENERGAN) 25 MG tablet Take 25 mg by mouth every 6 hours as needed for Nausea    Historical Provider, MD       Current medications:    No current facility-administered medications for this encounter. Current Outpatient Medications   Medication Sig Dispense Refill    ibuprofen (ADVIL;MOTRIN) 200 MG tablet Take 600 mg by mouth every 6-8 hours as needed for Pain      HYDROcodone-acetaminophen (NORCO) 5-325 MG per tablet Take 1 tablet by mouth every 4-6 hours as needed for Pain.       Phenazopyridine HCl (AZO TABS PO) Take by mouth as needed (urinary pain) Taking OTC      cephALEXin (KEFLEX) 500 MG capsule Take 1 capsule by mouth 2 times daily for 7 days 14 capsule 0    oxybutynin (DITROPAN) 5 MG tablet Take 1 tablet by mouth 3 times daily 90 tablet 3    ondansetron (ZOFRAN-ODT) 4 MG disintegrating tablet Take 1 tablet by mouth every 8 hours as needed for Nausea or Vomiting 30 tablet 0    metoprolol succinate (TOPROL XL) 25 MG extended release tablet Take 25 mg by mouth daily      Semaglutide, 1 MG/DOSE, 2 MG/1.5ML SOPN Inject into the skin once a week       levothyroxine (SYNTHROID) 150 MCG tablet Take 150 mcg by mouth Daily      lisinopril (PRINIVIL;ZESTRIL) 20 MG tablet Take 20 mg by mouth daily      pregabalin (LYRICA) 150 MG capsule Take 300 mg by mouth 2 times daily.  simvastatin (ZOCOR) 20 MG tablet Take 20 mg by mouth nightly      aspirin 81 MG EC tablet Take 81 mg by mouth daily      topiramate (TOPAMAX) 100 MG tablet Take 100 mg by mouth daily      omeprazole (PRILOSEC) 20 MG delayed release capsule Take 40 mg by mouth nightly      famotidine (PEPCID) 20 MG tablet Take 20 mg by mouth daily      Insulin Pump - Insulin regular Inject into the skin continuous Insulin-to-Carb Ratio (ICR): Insulin Sensitivity Factor (ISF):   Target Blood Glucose: Bolus Frequency:   Humalog      sertraline (ZOLOFT) 100 MG tablet Take 150 mg by mouth daily      promethazine (PHENERGAN) 25 MG tablet Take 25 mg by mouth every 6 hours as needed for Nausea         Allergies: Allergies   Allergen Reactions    Dilaudid [Hydromorphone Hcl] Rash    Metformin And Related Nausea And Vomiting       Problem List:    Patient Active Problem List   Diagnosis Code    Pyelonephritis N12    Renal calculi N20.0       Past Medical History:        Diagnosis Date    Depression     Diabetes mellitus ([facility] [address]     GERD (gastroesophageal reflux disease)     Hypertension     Thyroid disease        Past Surgical History:        Procedure Laterality Date    APPENDECTOMY  1989    CHOLECYSTECTOMY  1995    CYSTOSCOPY Left 12/28/2021    CYSTOSCOPY LEFT RETROGRADE PYLEOGRAMS Stent [facility]  2014    HYSTERECTOMY, TOTAL ABDOMINAL  2017    KIDNEY STONE SURGERY  2017    SPINAL CORD STIMULATOR SURGERY  2017   [address] [place]       Social History:    Social History     Tobacco Use    Smoking status: Never Smoker    Smokeless tobacco: Never Used   Substance Use Topics    Alcohol use: Not Currently     Comment: occ                                Counseling given: Not Answered      Vital Signs (Current): There were no vitals filed for this visit. BP Readings from Last 3 Encounters:   12/30/21 98/63   12/28/21 93/70   10/07/21 132/81       NPO Status:                                                                                 BMI:   Wt Readings from Last 3 Encounters:   12/27/21 195 lb (88.5 kg)   10/07/21 213 lb (96.6 kg)     There is no height or weight on file to calculate BMI.    CBC:   Lab Results   Component Value Date    WBC 7.4 12/30/2021    RBC 3.13 12/30/2021    HGB 9.4 12/30/2021    HCT 31.3 12/30/2021    .0 12/30/2021    RDW 13.1 12/30/2021     12/30/2021       CMP:   Lab Results   Component Value Date     12/30/2021    K 3.8 12/30/2021     12/30/2021    CO2 20 12/30/2021    BUN 12 12/30/2021    CREATININE 0.8 12/30/2021    GFRAA >60 12/30/2021    LABGLOM >60 12/30/2021    GLUCOSE 192 12/30/2021    CALCIUM 7.7 12/30/2021       POC Tests: No results for input(s): POCGLU, POCNA, POCK, POCCL, POCBUN, POCHEMO, POCHCT in the last 72 hours. Coags: No results found for: PROTIME, INR, APTT    HCG (If Applicable): No results found for: PREGTESTUR, PREGSERUM, HCG, HCGQUANT     ABGs: No results found for: PHART, PO2ART, WTG9LCE, GFZ5WMV, BEART, E6YSXMBV     Type & Screen (If Applicable):  No results found for: LABABO, LABRH    Drug/Infectious Status (If Applicable):  No results found for: HIV, HEPCAB    COVID-19 Screening (If Applicable):   Lab Results   Component Value Date    COVID19 NOT DETECTED 01/05/2022           Anesthesia Evaluation  Patient summary reviewed   history of anesthetic complications: PONV. Airway: Mallampati: II  TM distance: >3 FB   Neck ROM: full  Mouth opening: > = 3 FB Dental:          Pulmonary:normal exam                               Cardiovascular:    (+) hypertension:,                   Neuro/Psych:               GI/Hepatic/Renal:   (+) GERD:,           Endo/Other:    (+) DiabetesType II DM, , .                 Abdominal:             Vascular:           Other Findings:           Anesthesia Plan      general     ASA 3     ( Pre Anesthesia Assessment complete. Chart reviewed on 1/6/2022)  Induction: intravenous. MIPS: Postoperative opioids intended. Anesthetic plan and risks discussed with patient. Plan discussed with CRNA.     Attending anesthesiologist reviewed and agrees with JULIA Varela - PANCHO   1/6/2022

## 2022-01-06 NOTE — PROGRESS NOTES
I called and left a voicemail message with my call back dayna. I requested patient call me back to complete the phone PAT assessment. I did leave pre-op instructions. Addendum: Phone PAT completed 1/6/22 at [phone] [id number]. Surgery is on   1/7/22  you will be called later today between 1430 and 1600 to confirm surgery and arrival times. 1. Do not eat or drink anything after midnight - unless instructed by your doctor prior to surgery. This includes                   no water, chewing gum or mints. 2. Follow your directions as prescribed by the doctor for your procedure and medications. 3. Check with your Doctor regarding stopping vitamins, supplements, blood thinners (Plavix, Coumadin, Lovenox, Effient, Pradaxa, Xarelto, Fragmin or                   other blood thinners) and follow their instructions. Stop all supplements and herbals until after surgery. Hold aspirin and ibuprofen until after surgery. Morning of surgery take famotidine, synthroid,  Lyrica, metoprolol with sip. 4. Do not smoke, and do not drink any alcoholic beverages 24 hours prior to surgery. This includes NA Beer. 5. You may brush your teeth and gargle the morning of surgery. DO NOT SWALLOW WATER   6. You MUST make arrangements for a responsible adult to take you home after your surgery and be able to check on you every couple                   hours for the day. You will not be allowed to leave alone or drive yourself home. It is strongly suggested someone stay with you the first 24                   hrs. Your surgery will be cancelled if you do not have a ride home. 7. Please wear simple, loose fitting clothing to the hospital.  Giuseppe Slight not bring valuables (money, credit cards, checkbooks, etc.) Do not wear any makeup (including no eye makeup) or nail polish on your fingers or toes. 8. DO NOT wear any jewelry or piercings on day of surgery. All body piercing jewelry must be removed.              9. If you have dentures, they will be removed before going to the OR; we will provide you a container. If you wear contact lenses or glasses,                  they will be removed; please bring a case for them. 10. If you  have a Living Will and Durable Power of  for Healthcare, please bring in a copy. 11. Please bring picture ID,  insurance card, paperwork from the doctors office    (H & P, Consent, & card for implantable devices). 12. Take a shower the night before or morning of your procedure, do not apply any lotion, oil or powder. 13. Wear a mask covering your nose & mouth when entering the hospital. Have your covid-19 test performed within 2-7 days of your                  surgery. Quarantine yourself after the test until after your surgery.   COVID test done 1/5/22 negative

## 2022-01-07 ENCOUNTER — HOSPITAL ENCOUNTER (OUTPATIENT)
Age: 47
Setting detail: OUTPATIENT SURGERY
Discharge: HOME | End: 2022-01-07
Attending: SPECIALIST | Admitting: SPECIALIST
Payer: COMMERCIAL

## 2022-01-07 ENCOUNTER — ANESTHESIA (OUTPATIENT)
Dept: OPERATING ROOM | Age: 47
End: 2022-01-07
Payer: COMMERCIAL

## 2022-01-07 ENCOUNTER — APPOINTMENT (OUTPATIENT)
Dept: GENERAL RADIOLOGY | Age: 47
End: 2022-01-07
Attending: SPECIALIST
Payer: COMMERCIAL

## 2022-01-07 VITALS
DIASTOLIC BLOOD PRESSURE: 67 MMHG | TEMPERATURE: 98.2 F | SYSTOLIC BLOOD PRESSURE: 105 MMHG | RESPIRATION RATE: 9 BRPM | OXYGEN SATURATION: 97 %

## 2022-01-07 VITALS
WEIGHT: 197 LBS | RESPIRATION RATE: 16 BRPM | HEART RATE: 56 BPM | DIASTOLIC BLOOD PRESSURE: 82 MMHG | SYSTOLIC BLOOD PRESSURE: 131 MMHG | TEMPERATURE: 97.7 F | OXYGEN SATURATION: 95 % | HEIGHT: 63 IN | BODY MASS INDEX: 34.91 KG/M2

## 2022-01-07 DIAGNOSIS — G89.18 POST-OPERATIVE PAIN: ICD-10-CM

## 2022-01-07 DIAGNOSIS — Z01.818 ENCOUNTER FOR PREADMISSION TESTING: Primary | ICD-10-CM

## 2022-01-07 LAB
BASOPHILS ABSOLUTE: 0.1 K/CU MM
BASOPHILS RELATIVE PERCENT: 1.5 % (ref 0–1)
DIFFERENTIAL TYPE: ABNORMAL
EKG ATRIAL RATE: 54 BPM
EKG DIAGNOSIS: NORMAL
EKG P AXIS: 15 DEGREES
EKG P-R INTERVAL: 156 MS
EKG Q-T INTERVAL: 448 MS
EKG QRS DURATION: 82 MS
EKG QTC CALCULATION (BAZETT): 424 MS
EKG R AXIS: 3 DEGREES
EKG T AXIS: 6 DEGREES
EKG VENTRICULAR RATE: 54 BPM
EOSINOPHILS ABSOLUTE: 0.3 K/CU MM
EOSINOPHILS RELATIVE PERCENT: 5.4 % (ref 0–3)
GLUCOSE BLD-MCNC: 194 MG/DL (ref 70–99)
GLUCOSE BLD-MCNC: 248 MG/DL (ref 70–99)
HCT VFR BLD CALC: 35.7 % (ref 37–47)
HEMOGLOBIN: 10.9 GM/DL (ref 12.5–16)
IMMATURE NEUTROPHIL %: 0.2 % (ref 0–0.43)
LYMPHOCYTES ABSOLUTE: 1.4 K/CU MM
LYMPHOCYTES RELATIVE PERCENT: 25.4 % (ref 24–44)
MCH RBC QN AUTO: 30.7 PG (ref 27–31)
MCHC RBC AUTO-ENTMCNC: 30.5 % (ref 32–36)
MCV RBC AUTO: 100.6 FL (ref 78–100)
MONOCYTES ABSOLUTE: 0.4 K/CU MM
MONOCYTES RELATIVE PERCENT: 8.2 % (ref 0–4)
NUCLEATED RBC %: 0 %
PDW BLD-RTO: 13.8 % (ref 11.7–14.9)
PLATELET # BLD: 238 K/CU MM (ref 140–440)
PMV BLD AUTO: 11.3 FL (ref 7.5–11.1)
RBC # BLD: 3.55 M/CU MM (ref 4.2–5.4)
SEGMENTED NEUTROPHILS ABSOLUTE COUNT: 3.2 K/CU MM
SEGMENTED NEUTROPHILS RELATIVE PERCENT: 59.3 % (ref 36–66)
TOTAL IMMATURE NEUTOROPHIL: 0.01 K/CU MM
TOTAL NUCLEATED RBC: 0 K/CU MM
WBC # BLD: 5.4 K/CU MM (ref 4–10.5)

## 2022-01-07 PROCEDURE — 3600000013 HC SURGERY LEVEL 3 ADDTL 15MIN: Performed by: SPECIALIST

## 2022-01-07 PROCEDURE — 7100000001 HC PACU RECOVERY - ADDTL 15 MIN: Performed by: SPECIALIST

## 2022-01-07 PROCEDURE — 7100000010 HC PHASE II RECOVERY - FIRST 15 MIN: Performed by: SPECIALIST

## 2022-01-07 PROCEDURE — 6360000002 HC RX W HCPCS: Performed by: NURSE ANESTHETIST, CERTIFIED REGISTERED

## 2022-01-07 PROCEDURE — C1894 INTRO/SHEATH, NON-LASER: HCPCS | Performed by: SPECIALIST

## 2022-01-07 PROCEDURE — 3209999900 FL LESS THAN 1 HOUR

## 2022-01-07 PROCEDURE — 6360000002 HC RX W HCPCS: Performed by: SPECIALIST

## 2022-01-07 PROCEDURE — 7100000011 HC PHASE II RECOVERY - ADDTL 15 MIN: Performed by: SPECIALIST

## 2022-01-07 PROCEDURE — 2720000010 HC SURG SUPPLY STERILE: Performed by: SPECIALIST

## 2022-01-07 PROCEDURE — 2709999900 HC NON-CHARGEABLE SUPPLY: Performed by: SPECIALIST

## 2022-01-07 PROCEDURE — 3700000000 HC ANESTHESIA ATTENDED CARE: Performed by: SPECIALIST

## 2022-01-07 PROCEDURE — 6370000000 HC RX 637 (ALT 250 FOR IP): Performed by: ANESTHESIOLOGY

## 2022-01-07 PROCEDURE — 6360000002 HC RX W HCPCS: Performed by: ANESTHESIOLOGY

## 2022-01-07 PROCEDURE — 3600000003 HC SURGERY LEVEL 3 BASE: Performed by: SPECIALIST

## 2022-01-07 PROCEDURE — 93005 ELECTROCARDIOGRAM TRACING: CPT | Performed by: ANESTHESIOLOGY

## 2022-01-07 PROCEDURE — 2580000003 HC RX 258: Performed by: SPECIALIST

## 2022-01-07 PROCEDURE — 93010 ELECTROCARDIOGRAM REPORT: CPT | Performed by: INTERNAL MEDICINE

## 2022-01-07 PROCEDURE — 2580000003 HC RX 258: Performed by: NURSE ANESTHETIST, CERTIFIED REGISTERED

## 2022-01-07 PROCEDURE — C1769 GUIDE WIRE: HCPCS | Performed by: SPECIALIST

## 2022-01-07 PROCEDURE — 82962 GLUCOSE BLOOD TEST: CPT

## 2022-01-07 PROCEDURE — 6370000000 HC RX 637 (ALT 250 FOR IP): Performed by: NURSE ANESTHETIST, CERTIFIED REGISTERED

## 2022-01-07 PROCEDURE — 85025 COMPLETE CBC W/AUTO DIFF WBC: CPT

## 2022-01-07 PROCEDURE — 7100000000 HC PACU RECOVERY - FIRST 15 MIN: Performed by: SPECIALIST

## 2022-01-07 PROCEDURE — 3700000001 HC ADD 15 MINUTES (ANESTHESIA): Performed by: SPECIALIST

## 2022-01-07 RX ORDER — FENTANYL CITRATE 50 UG/ML
50 INJECTION, SOLUTION INTRAMUSCULAR; INTRAVENOUS EVERY 5 MIN PRN
Status: DISCONTINUED | OUTPATIENT
Start: 2022-01-07 | End: 2022-01-07 | Stop reason: HOSPADM

## 2022-01-07 RX ORDER — CEFAZOLIN SODIUM 2 G/100ML
2000 INJECTION, SOLUTION INTRAVENOUS ONCE
Status: COMPLETED | OUTPATIENT
Start: 2022-01-07 | End: 2022-01-07

## 2022-01-07 RX ORDER — LIDOCAINE HYDROCHLORIDE 20 MG/ML
INJECTION, SOLUTION INTRAVENOUS PRN
Status: DISCONTINUED | OUTPATIENT
Start: 2022-01-07 | End: 2022-01-07 | Stop reason: SDUPTHER

## 2022-01-07 RX ORDER — SODIUM CHLORIDE, SODIUM LACTATE, POTASSIUM CHLORIDE, CALCIUM CHLORIDE 600; 310; 30; 20 MG/100ML; MG/100ML; MG/100ML; MG/100ML
INJECTION, SOLUTION INTRAVENOUS CONTINUOUS PRN
Status: DISCONTINUED | OUTPATIENT
Start: 2022-01-07 | End: 2022-01-07 | Stop reason: SDUPTHER

## 2022-01-07 RX ORDER — HYDROCODONE BITARTRATE AND ACETAMINOPHEN 5; 325 MG/1; MG/1
2 TABLET ORAL EVERY 6 HOURS PRN
Status: COMPLETED | OUTPATIENT
Start: 2022-01-07 | End: 2022-01-07

## 2022-01-07 RX ORDER — SODIUM CHLORIDE, SODIUM LACTATE, POTASSIUM CHLORIDE, CALCIUM CHLORIDE 600; 310; 30; 20 MG/100ML; MG/100ML; MG/100ML; MG/100ML
INJECTION, SOLUTION INTRAVENOUS ONCE
Status: COMPLETED | OUTPATIENT
Start: 2022-01-07 | End: 2022-01-07

## 2022-01-07 RX ORDER — PROPOFOL 10 MG/ML
INJECTION, EMULSION INTRAVENOUS PRN
Status: DISCONTINUED | OUTPATIENT
Start: 2022-01-07 | End: 2022-01-07 | Stop reason: SDUPTHER

## 2022-01-07 RX ORDER — ONDANSETRON 2 MG/ML
4 INJECTION INTRAMUSCULAR; INTRAVENOUS
Status: DISCONTINUED | OUTPATIENT
Start: 2022-01-07 | End: 2022-01-07 | Stop reason: HOSPADM

## 2022-01-07 RX ORDER — HYDROCODONE BITARTRATE AND ACETAMINOPHEN 5; 325 MG/1; MG/1
1 TABLET ORAL PRN
Status: COMPLETED | OUTPATIENT
Start: 2022-01-07 | End: 2022-01-07

## 2022-01-07 RX ORDER — HYDROMORPHONE HCL 110MG/55ML
0.5 PATIENT CONTROLLED ANALGESIA SYRINGE INTRAVENOUS EVERY 5 MIN PRN
Status: DISCONTINUED | OUTPATIENT
Start: 2022-01-07 | End: 2022-01-07 | Stop reason: HOSPADM

## 2022-01-07 RX ORDER — HYDRALAZINE HYDROCHLORIDE 20 MG/ML
5 INJECTION INTRAMUSCULAR; INTRAVENOUS EVERY 10 MIN PRN
Status: DISCONTINUED | OUTPATIENT
Start: 2022-01-07 | End: 2022-01-07 | Stop reason: HOSPADM

## 2022-01-07 RX ORDER — ONDANSETRON 2 MG/ML
INJECTION INTRAMUSCULAR; INTRAVENOUS PRN
Status: DISCONTINUED | OUTPATIENT
Start: 2022-01-07 | End: 2022-01-07 | Stop reason: SDUPTHER

## 2022-01-07 RX ORDER — FENTANYL CITRATE 50 UG/ML
INJECTION, SOLUTION INTRAMUSCULAR; INTRAVENOUS PRN
Status: DISCONTINUED | OUTPATIENT
Start: 2022-01-07 | End: 2022-01-07 | Stop reason: SDUPTHER

## 2022-01-07 RX ORDER — SCOLOPAMINE TRANSDERMAL SYSTEM 1 MG/1
PATCH, EXTENDED RELEASE TRANSDERMAL PRN
Status: DISCONTINUED | OUTPATIENT
Start: 2022-01-07 | End: 2022-01-07 | Stop reason: SDUPTHER

## 2022-01-07 RX ORDER — MIDAZOLAM HYDROCHLORIDE 1 MG/ML
INJECTION INTRAMUSCULAR; INTRAVENOUS PRN
Status: DISCONTINUED | OUTPATIENT
Start: 2022-01-07 | End: 2022-01-07 | Stop reason: SDUPTHER

## 2022-01-07 RX ORDER — LABETALOL HYDROCHLORIDE 5 MG/ML
5 INJECTION, SOLUTION INTRAVENOUS EVERY 10 MIN PRN
Status: DISCONTINUED | OUTPATIENT
Start: 2022-01-07 | End: 2022-01-07 | Stop reason: HOSPADM

## 2022-01-07 RX ORDER — HYDROCODONE BITARTRATE AND ACETAMINOPHEN 5; 325 MG/1; MG/1
1 TABLET ORAL EVERY 6 HOURS PRN
Qty: 14 TABLET | Refills: 0 | Status: SHIPPED | OUTPATIENT
Start: 2022-01-07 | End: 2022-01-12

## 2022-01-07 RX ORDER — MEPERIDINE HYDROCHLORIDE 25 MG/ML
12.5 INJECTION INTRAMUSCULAR; INTRAVENOUS; SUBCUTANEOUS EVERY 5 MIN PRN
Status: DISCONTINUED | OUTPATIENT
Start: 2022-01-07 | End: 2022-01-07 | Stop reason: HOSPADM

## 2022-01-07 RX ORDER — 0.9 % SODIUM CHLORIDE 0.9 %
500 INTRAVENOUS SOLUTION INTRAVENOUS
Status: DISCONTINUED | OUTPATIENT
Start: 2022-01-07 | End: 2022-01-07 | Stop reason: HOSPADM

## 2022-01-07 RX ORDER — DIPHENHYDRAMINE HYDROCHLORIDE 50 MG/ML
12.5 INJECTION INTRAMUSCULAR; INTRAVENOUS
Status: DISCONTINUED | OUTPATIENT
Start: 2022-01-07 | End: 2022-01-07 | Stop reason: HOSPADM

## 2022-01-07 RX ORDER — PROMETHAZINE HYDROCHLORIDE 25 MG/ML
6.25 INJECTION, SOLUTION INTRAMUSCULAR; INTRAVENOUS
Status: DISCONTINUED | OUTPATIENT
Start: 2022-01-07 | End: 2022-01-07 | Stop reason: HOSPADM

## 2022-01-07 RX ORDER — SCOLOPAMINE TRANSDERMAL SYSTEM 1 MG/1
1 PATCH, EXTENDED RELEASE TRANSDERMAL
Status: DISCONTINUED | OUTPATIENT
Start: 2022-01-07 | End: 2022-01-07 | Stop reason: HOSPADM

## 2022-01-07 RX ADMIN — LIDOCAINE HYDROCHLORIDE 100 MG: 20 INJECTION, SOLUTION INTRAVENOUS at 15:48

## 2022-01-07 RX ADMIN — MIDAZOLAM 1 MG: 1 INJECTION INTRAMUSCULAR; INTRAVENOUS at 15:39

## 2022-01-07 RX ADMIN — FENTANYL CITRATE 50 MCG: 50 INJECTION INTRAMUSCULAR; INTRAVENOUS at 17:08

## 2022-01-07 RX ADMIN — ONDANSETRON 4 MG: 2 INJECTION INTRAMUSCULAR; INTRAVENOUS at 15:54

## 2022-01-07 RX ADMIN — FENTANYL CITRATE 50 MCG: 50 INJECTION, SOLUTION INTRAMUSCULAR; INTRAVENOUS at 16:13

## 2022-01-07 RX ADMIN — SCOPOLAMINE 1 PATCH: 1 PATCH, EXTENDED RELEASE TRANSDERMAL at 15:58

## 2022-01-07 RX ADMIN — PROPOFOL 120 MG: 10 INJECTION, EMULSION INTRAVENOUS at 15:48

## 2022-01-07 RX ADMIN — FENTANYL CITRATE 50 MCG: 50 INJECTION INTRAMUSCULAR; INTRAVENOUS at 16:54

## 2022-01-07 RX ADMIN — SODIUM CHLORIDE, POTASSIUM CHLORIDE, SODIUM LACTATE AND CALCIUM CHLORIDE: 600; 310; 30; 20 INJECTION, SOLUTION INTRAVENOUS at 15:42

## 2022-01-07 RX ADMIN — SODIUM CHLORIDE, POTASSIUM CHLORIDE, SODIUM LACTATE AND CALCIUM CHLORIDE: 600; 310; 30; 20 INJECTION, SOLUTION INTRAVENOUS at 13:38

## 2022-01-07 RX ADMIN — CEFAZOLIN SODIUM 2000 MG: 2 INJECTION, SOLUTION INTRAVENOUS at 15:54

## 2022-01-07 RX ADMIN — HYDROCODONE BITARTRATE AND ACETAMINOPHEN 1 TABLET: 5; 325 TABLET ORAL at 17:37

## 2022-01-07 RX ADMIN — FENTANYL CITRATE 50 MCG: 50 INJECTION, SOLUTION INTRAMUSCULAR; INTRAVENOUS at 16:25

## 2022-01-07 ASSESSMENT — PULMONARY FUNCTION TESTS
PIF_VALUE: 6
PIF_VALUE: 2
PIF_VALUE: 5
PIF_VALUE: 6
PIF_VALUE: 8
PIF_VALUE: 5
PIF_VALUE: 10
PIF_VALUE: 5
PIF_VALUE: 7
PIF_VALUE: 5
PIF_VALUE: 8
PIF_VALUE: 5
PIF_VALUE: 5
PIF_VALUE: 6
PIF_VALUE: 4
PIF_VALUE: 5
PIF_VALUE: 2
PIF_VALUE: 5
PIF_VALUE: 6
PIF_VALUE: 5
PIF_VALUE: 8
PIF_VALUE: 5
PIF_VALUE: 6
PIF_VALUE: 4
PIF_VALUE: 5
PIF_VALUE: 7
PIF_VALUE: 7
PIF_VALUE: 1
PIF_VALUE: 5
PIF_VALUE: 6
PIF_VALUE: 6
PIF_VALUE: 4
PIF_VALUE: 8
PIF_VALUE: 1
PIF_VALUE: 7
PIF_VALUE: 1
PIF_VALUE: 11
PIF_VALUE: 1
PIF_VALUE: 24
PIF_VALUE: 5
PIF_VALUE: 24
PIF_VALUE: 6
PIF_VALUE: 4
PIF_VALUE: 5
PIF_VALUE: 6

## 2022-01-07 ASSESSMENT — PAIN SCALES - GENERAL
PAINLEVEL_OUTOF10: 7
PAINLEVEL_OUTOF10: 7
PAINLEVEL_OUTOF10: 3
PAINLEVEL_OUTOF10: 6
PAINLEVEL_OUTOF10: 2
PAINLEVEL_OUTOF10: 6

## 2022-01-07 ASSESSMENT — PAIN DESCRIPTION - FREQUENCY
FREQUENCY: CONTINUOUS

## 2022-01-07 ASSESSMENT — PAIN DESCRIPTION - PAIN TYPE
TYPE: SURGICAL PAIN

## 2022-01-07 ASSESSMENT — PAIN DESCRIPTION - LOCATION
LOCATION: FLANK
LOCATION: FLANK
LOCATION: ABDOMEN;FLANK
LOCATION: FLANK
LOCATION: ABDOMEN;FLANK

## 2022-01-07 ASSESSMENT — PAIN - FUNCTIONAL ASSESSMENT: PAIN_FUNCTIONAL_ASSESSMENT: 0-10

## 2022-01-07 ASSESSMENT — PAIN DESCRIPTION - DESCRIPTORS
DESCRIPTORS: ACHING
DESCRIPTORS: DISCOMFORT
DESCRIPTORS: ACHING
DESCRIPTORS: DISCOMFORT
DESCRIPTORS: ACHING

## 2022-01-07 ASSESSMENT — PAIN DESCRIPTION - ORIENTATION
ORIENTATION: LEFT

## 2022-01-07 ASSESSMENT — PAIN DESCRIPTION - PROGRESSION: CLINICAL_PROGRESSION: GRADUALLY IMPROVING

## 2022-01-07 NOTE — BRIEF OP NOTE
Brief Postoperative Note      Patient: Severa Bonito  YOB: 1975  MRN: [id number]    Date of Procedure: 1/7/2022    Pre-Op Diagnosis: uretheral stone    Post-Op Diagnosis: Same       Procedure(s):  LEFT CYSTOSCOPY URETEROSCOPY RETROGRADE PYELOGRAM STONE MANIPULATION WITH HOLIUM LASER LITHOTRIPSY  CYSTOSCOPY RETROGRADE PYELOGRAM URETERAL STENT REMOVAL    Surgeon(s):  Zaina Betts MD    Assistant:  * No surgical staff found *    Anesthesia: General    Estimated Blood Loss (mL): Minimal    Complications: None    Specimens:   * No specimens in log *    Implants:  * No implants in log *      Drains: * No LDAs found *    Findings: 9-10 mm left renal pelvic stone    Electronically signed by Zaina Betts MD on 1/7/2022 at 4:33 PM

## 2022-01-07 NOTE — OP NOTE
[address] [place] [address], [address] [place] [address]                                OPERATIVE REPORT    PATIENT NAME: Reginald Rodriges                     :        1975  MED REC NO:   [id number]                          ROOM:  ACCOUNT NO:   [de-identified]                           ADMIT DATE: 2022  PROVIDER:     Shruti Feliciano MD    DATE OF PROCEDURE:  2022    PREOPERATIVE DIAGNOSIS:  Left renal calculus. POSTOPERATIVE DIAGNOSIS:  Left renal calculus. SURGICAL PROCEDURE:  Cystoscopy, left ureteral stent removal, left  uteroscopy, laser lithotripsy. SURGEON:  Shruti Feliciano MD    ANESTHESIA:  General anesthesia. ESTIMATED BLOOD LOSS:  None. COMPLICATIONS:  None. DRAINS:  None. FINDINGS:  A 9-10 mm left renal pelvic stone. BRIEF HISTORY:  This is a 40-year-old female, who several weeks ago  underwent cysto, left stent placement secondary to obstructing stone and  UTI.  UTI is cleared. She is here now for definitive treatment of her  stone. DESCRIPTION OF PROCEDURE:  The patient was identified in the holding  area, taken to the procedure room, placed in a dorsal lithotomy  position. The patient's genital region was prepped and draped in  sterile fashion. A 21-Cuban cystoscope sheath was introduced per  urethra under direct visualization. Left ureteral stent was identified,  grasped and removed intact. Two sensor wires were passed up the left  ureter. A navigator ureteral access sheath was passed over one of the  working wires to the left distal ureter. Working wire was removed. Safety wire was left in place. The LithoVue flexible ureteroscope was  then passed via the access sheath into the left ureter under direct  visualization to the stone which was in the middle of the left renal  pelvis.   Using a 365-micron holmium laser, fiber setting at 0.5 joules,  rate of 25, stone was fragmented into multiple small fragments in a  dusting technique which were 1 mm or less. Once this was done,  procedure was stopped. We elected then to replace the stent. Ureteroscope access sheath and safety wire were removed. The patient  was taken to the recovery room. She tolerated the procedure well. DISCHARGE SUMMARY:  The patient was discharged from same-day surgery in  good condition. We will see her back in the office in next couple of  weeks for postoperative assessment.         Mikie Primrose, MD    D: 01/07/2022 16:38:35       T: 01/07/2022 16:41:53     [email or web address]  Job#: [id number]     Doc#: [id number]    CC:

## 2022-01-08 NOTE — ANESTHESIA POSTPROCEDURE EVALUATION
Department of Anesthesiology  Postprocedure Note    Patient: Ania Gomez  MRN: [id number]  YOB: 1975  Date of evaluation: 1/7/2022  Time:  7:18 PM     Procedure Summary     Date: 01/07/22 Room / Location: [place] [address] [facility]    Anesthesia Start: 1542 Anesthesia Stop: 1697    Procedures:       LEFT CYSTOSCOPY URETEROSCOPY RETROGRADE PYELOGRAM STONE [place] LITHOTRIPSY (Left Ureter)      CYSTOSCOPY RETROGRADE PYELOGRAM URETERAL STENT REMOVAL (Left Ureter) Diagnosis:       Ureteral stone      (uretheral stone)    Surgeons: Bindu Tierney MD Responsible Provider: Lester Damon MD    Anesthesia Type: general ASA Status: 3          Anesthesia Type: general    Olivier Phase I: Olivier Score: 8    Olivier Phase II: Olivier Score: 10    Last vitals: Reviewed and per EMR flowsheets.        Anesthesia Post Evaluation    Patient location during evaluation: PACU  Patient participation: complete - patient participated  Level of consciousness: awake  Pain score: 3  Airway patency: patent  Nausea & Vomiting: no vomiting and no nausea  Complications: no  Cardiovascular status: blood pressure returned to baseline and hemodynamically stable  Respiratory status: acceptable  Hydration status: stable

## 2022-01-11 ENCOUNTER — CARE COORDINATION (OUTPATIENT)
Dept: OTHER | Facility: CLINIC | Age: 47
End: 2022-01-11

## 2022-01-11 NOTE — CARE COORDINATION
Care Transitions Outreach Attempt    Call within 2 business days of discharge: Yes   Attempted to reach patient for transitions of care follow up. Unable to reach patient. HIPAA compliant voicemail message left with request for return call. Will continue to follow. Patient: Zackary Macdonald Patient : 1975 MRN: [id number]    Last Discharge [facility]       Complaint Diagnosis Description Type Department Provider    22  Encounter for preadmission testing . .. Admission (Discharged) Reynold Mar MD            Was this an external facility discharge? No Discharge Facility: [address] [place] [address]    Noted following upcoming appointments from discharge chart review:   [facility] follow up appointment(s): No future appointments. [facility] follow up appointment(s): Will assess with successful outreach    ROSA Young RN  Associate Care Manager  Phone: [phone]  Email: [email or web address]

## 2022-01-12 ENCOUNTER — CARE COORDINATION (OUTPATIENT)
Dept: OTHER | Facility: CLINIC | Age: 47
End: 2022-01-12

## 2022-01-12 NOTE — CARE COORDINATION
Nyla 45 Transitions Follow Up Call    2022    Patient: Iza Quiroz  Patient : 1975   MRN: [id number]  Reason for Admission: Left cystoscopy with pyelogram and laser lithotripsy  Discharge Date: 22 RARS: Readmission Risk Score: 11 ( )         Spoke with: Iza Quiroz, patient      Care Transitions Subsequent and Final Call    Subsequent and Final Calls  Do you have any ongoing symptoms?: No  Have your medications changed?: No  Do you have any questions related to your medications?: No  Do you currently have any active services?: No  Do you have any needs or concerns that I can assist you with?: Yes  Patient-reported Needs or Concerns: interested in enrollment in BWWD program  Identified Barriers: Stress  Care Transitions Interventions  Other Interventions:         Transitions of Care Initial Call    Was this an external facility discharge? No Discharge Facility: [address] [place] [address] to be reviewed by the provider   Additional needs identified to be addressed with provider: No  none             Method of communication with provider : none      Advance Care Planning:   Does patient have an Advance Directive: not addressed this date. Was this a readmission? No  Patient stated reason for admission: stent and stone removal  Patients top risk factors for readmission: medical condition-patient recently discharged after stent and stone removal  PCP relationship    Care Transition Nurse (CTN) contacted the patient by telephone to perform post hospital discharge assessment. Verified name and  with patient as identifiers. Provided introduction to self, and explanation of the CTN role. Patient returned call to [facility] this date and states \"doing well\" after surgery on Friday to remove stent and stone. Denies pain since procedure. Denies hematuria. States returned to work yesterday and denies issues.  States she has follow up appointment with Dr. Rakan Hernandez this Friday and plans to discuss prevention of further stones at that time. Patient c/o symptoms of \"raging yeast infection\" and reports using OTC Monostat currently. States if symptoms do not resolve prior to appointment on Friday, she plans to request RX from Dr. Twila Ann. ACM discussed Be Well with DM program that [place] [address] offers DM associates (and their family members on medical plan). ACM briefly discussed benefits associated with joinigng program and offered to provide patient with information of program as well as program application. Patient accepted offer and requested info be emailed to [employer]. ACM emailed info and sent referral to [facility]. Patient states needs to get established with PCP and new endo. States currently sees and endo in [place] that is out of network. States most recent visit to that endo was November 2021 but verbalized desire to get established with in network endo. [facility] notified patient that the in network endo in her area is Dr. Vianey Romero and offered to call office for patient to see about getting initial office visit scheduled. Patient accepted offer. Patient states would like to get established with Ms. Maribel Santoyo, [address] and is going to try to call the office to get initial office visit scheduled. States unsure if current rheumatologist is in network or not. ACM offered to check- patient accepted offer and provided name of provider (Dr. Juan J Gabriel in [place]). [facility] located information on [facility] website that Dr. Lisa Marquez is in network tier 1 and sent this information to patient via email. Patient questioned ACM who she needs to go through for insulin pump supplies and states she is currently using [employer]. ACM notified patient that [facility] in network. CTN reviewed discharge instructions, medical action plan and red flags with patient who verbalized understanding.  Patient given an opportunity to ask questions and does not have any further questions or concerns at this time. Were discharge instructions available to patient? Yes. Reviewed appropriate site of care based on symptoms and resources available to patient including: Specialist, Benefits related nurse triage line and [employer] Messaging. The patient agrees to contact the PCP office for questions related to their healthcare. Medication reconciliation was performed with patient, who verbalizes understanding of administration of home medications. Advised obtaining a 90-day supply of all daily and as-needed medications. Covid Risk Education     Educated patient about risk for severe COVID-19 due to risk factors according to CDC guidelines. ACM reviewed discharge instructions, medical action plan and red flag symptoms with the patient who verbalized understanding. Discussed COVID vaccination status: No. Education provided on COVID-19 vaccination as appropriate. Discussed exposure protocols and quarantine with CDC Guidelines. Patient was given an opportunity to verbalize any questions and concerns and agrees to contact ACM or health care provider for questions related to their healthcare. Reviewed and educated patient on any new and changed medications related to discharge diagnosis. Was patient discharged with a pulse oximeter? No Discussed and confirmed pulse oximeter discharge instructions and when to notify provider or seek emergency care. ACM provided contact information. Plan for follow-up call in 5-7 days based on severity of symptoms and risk factors.   Plan for next call: follow up appointment-getting established with new PCP and endo  referrals-ACM sent referral to Be Well with DM         Follow Up  Follow-up With  Details  Why  Contact Info   Manish Freedman MD  On 1/14/2022  Urology; post-op follow up  [address] [facility]  [phone]     Plan:  -[facility] to call Dr. Marcia Loera office ([phone]) to see if referral is needed to get patient scheduled for initial office visit (done). -ACM to email patient information on enrollment & program application on [facility] (done- sent). -ACM to find out if Dr. Dianelys Martinez is in network with patient's [employer] plan (done- ACM verified that Dr. Lucero Ojeda is in network covered at tier 1 per information listed on [employer] find care site and emailed this information to patient). -ACM to follow up with patient next week. Peter Sawyer MSN RN  Associate Care Manager  Phone: [phone]  Email: [email or web address]

## 2022-01-19 ENCOUNTER — CARE COORDINATION (OUTPATIENT)
Dept: OTHER | Facility: CLINIC | Age: 47
End: 2022-01-19

## 2022-01-19 ENCOUNTER — TELEPHONE (OUTPATIENT)
Dept: PHARMACY | Facility: CLINIC | Age: 47
End: 2022-01-19

## 2022-01-19 NOTE — CARE COORDINATION
Nyla 45 Transitions Follow Up Call    2022    Patient: Iza Quiroz  Patient : 1975   MRN: [id number]  Reason for Admission: Left cystoscopy with pyelogram and laser lithotripsy  Discharge Date: 22 RARS: Readmission Risk Score: 11 ( )         Spoke with: N/A    ACM attempted to reach patient for follow up call. HIPAA compliant message left requesting a return phone call at patients convenience. Will continue to follow. Follow Up  Future Appointments   Date Time Provider Yousuf Godinez   3/28/2022  1:30 PM Margarita Lee [facility] KIA Ol[place], MSN RN  Associate Care Manager  Phone: [phone]  Email: [email or web address]

## 2022-01-19 NOTE — TELEPHONE ENCOUNTER
Pharmacy Pop Care Documentation:   Patient is missing the following requirements:  DM Program Application; If completed by 01/25/22 patient will be eligible for enrollment in the DM Program on 02/01/22    Care coordinator emailed application to patient.  Called patient and left VM for callback regarding program.       Christiane Ferrer, [employer]   Phone: [phone]

## 2022-01-19 NOTE — TELEPHONE ENCOUNTER
Hello,     I have a new referral for BWWD program. Shes T1 DM with insulin pump. Im working with her to get established with PCP and endo. Currently getting insulin pump supplies from [employer] but aware will most likely need to change companies. Im emailing program application to patient today. Patient: Kaila Hubbard  : 1975  MRN: [id number]    Thank you!   Sumit Lr

## 2022-01-19 NOTE — LETTER
[facility]  [address], [address]  Phone: toll free [phone], option 3      [employer]  [address] [place] [id number]        01/25/22  Dear Patricio Ballard,    Thanks so much for taking the first step towards better health. This letter is to inform you that we have received information that you are interested in enrolling in the [address] [place] [address] With Diabetes Program, but you are missing the following requirements or documentation:     Diabetes Management Program Application       To qualify for this program the above requirements must be met by 02/25/22 for enrollment into the program on 03/01/22. Results or visits obtained outside of [address] [place] [address] will need to be provided by fax: [phone] or email: [email or web address] before the deadline in order to qualify for the program.     If requirements are not met by the date listed above, you will be not be enrolled in the program.     Addy 2  Phone: toll free [phone], option 3  Email: [email or web address]  Fax Number: [phone]

## 2022-01-25 NOTE — TELEPHONE ENCOUNTER
Called patient a second time about program. Left VM. Will send letter with enrollment forms attached.

## 2022-01-28 ENCOUNTER — CARE COORDINATION (OUTPATIENT)
Dept: OTHER | Facility: CLINIC | Age: 47
End: 2022-01-28

## 2022-01-28 NOTE — CARE COORDINATION
Nyla 45 Transitions Follow Up Call    2022    Patient: Nisreen Hernandez  Patient : 1975   MRN: [id number]  Reason for Admission: Left cystoscopy with pyelogram and laser lithotripsy  Discharge Date: 22 RARS: Readmission Risk Score: 11 ( )         Spoke with: N/A    ACM attempted to reach patient for follow up call. HIPAA compliant message left requesting a return phone call at patients convenience. Will continue to follow. ACM also sent message to patient via [employer]. Follow Up  Future Appointments   Date Time Provider Yousuf Godinez   3/28/2022  1:30 PM Lauryn Kasper, [facility] ROSA Whitten RN  Associate Care Manager  Phone: [phone]  Email: [email or web address]

## 2022-02-04 ENCOUNTER — CARE COORDINATION (OUTPATIENT)
Dept: OTHER | Facility: CLINIC | Age: 47
End: 2022-02-04

## 2022-03-01 ENCOUNTER — CARE COORDINATION (OUTPATIENT)
Dept: OTHER | Facility: CLINIC | Age: 47
End: 2022-03-01

## 2022-03-01 RX ORDER — HYDROCODONE BITARTRATE AND ACETAMINOPHEN 5; 325 MG/1; MG/1
1 TABLET ORAL EVERY 6 HOURS PRN
COMMUNITY
End: 2022-03-28

## 2022-03-01 NOTE — CARE COORDINATION
Nyla 45 Transitions Initial Follow Up Call    Call within 2 business days of discharge: No    Patient: Skylar Lovell Patient : 1975   MRN: [id number]  Reason for Admission: FRANK, DKA  Discharge Date: 2022  RARS: Readmission Risk Score: 11 ( )      Last Discharge [facility]       Complaint Diagnosis Description Type Department Provider    22  Encounter for preadmission testing . .. Admission (Discharged) Nabil Thompson MD           Spoke with: Skylar Lovell, patient    Facility: [address] [place] [address]    Non-face-to-face services provided:  Obtained and reviewed discharge summary and/or continuity of care documents  Assessment and support for treatment adherence and medication management-complete medication review performed with patient    Care Transitions 24 Hour Call    Do you have any ongoing symptoms?: Yes  Patient-reported symptoms: Fatigue, Weakness, Pain  Do you have a copy of your discharge instructions?: Yes  Do you have all of your prescriptions and are they filled?: Yes  Have you scheduled your follow up appointment?: Yes  How are you going to get to your appointment?: Car - family or friend to transport  Were you discharged with any Home Care or Post Acute Services: No  Care Transitions Interventions       Transitions of Care Initial Call    Was this an external facility discharge? Yes, 2022 Discharge Facility: [place] [address] to be reviewed by the provider   Additional needs identified to be addressed with provider: No  none             Method of communication with provider : chart routing- to notify of new CT episode      Advance Care Planning:   Does patient have an Advance Directive: decision maker updated. Was this a readmission?  No  Patient stated reason for admission: DKA  Patients top risk factors for readmission: functional physical ability  medical condition-recently hospitalized for DKA; still with fatigue and weakness  multiple health system providers  PCP relationship  polypharmacy  stages of grief  caregiver stress    Care Transition Nurse (CTN) contacted the patient by telephone to perform post hospital discharge assessment. Verified name and  with patient as identifiers. Provided introduction to self, and explanation of the CTN role. Patient returned call to [facility] this date and states still with fatigue and weakness related to recent hospitalization and DKA. States she is currently staying with daughter and sleeping a lot since recent hospital discharge. States has been monitoring blood sugar with CGM on insulin pump and denies BS >200 since discharge. States she did have 1 episode of hypoglycemia (43) since discharge; reports BS back to normal after eating fast acting carbohydrate. Patient states appetite was poor while inpatient but now improving. States she has been eating small, frequent meals since discharge. Reports constant nausea (related to achalasia) which she states is controlled with PRN anti-emetics. Denies recent vomiting. Reports most recent BM yesterday. Denies diarrhea or constipation. Denies issues with urination. Patient states needs in network cardiology follow up. [facility] to locate in network provider that is accepting new patients and get appointment scheduled. Patient requested [facility] call Dr. Pete Blair to reschedule endo office visit. States she missed recent office visit as she was inpatient. Patient states she is currently off work on [employer] with no projected return to work date yet. States she is working on getting established with short term disability. Patient reports to [employer] that her daughter will be taking her to office visits and she is off work on  and . [facility] questioned patient about financial needs as her daughter found her unresponsive and hypothermic in her home prior to recent hospitalization. Hospital note stated heat was turned off in the residence due to bill not paid.  Patient states bill was not paid due to her being unresponsive but bill is now paid. Denies financial needs. Agreeable to [facility] up calls from [facility]. CTN reviewed discharge instructions, medical action plan and red flags with patient who verbalized understanding. Patient given an opportunity to ask questions and does not have any further questions or concerns at this time. Were discharge instructions available to patient? Yes. Reviewed appropriate site of care based on symptoms and resources available to patient including: PCP, Specialist, Benefits related nurse triage line and [employer] Messaging. The patient agrees to contact the PCP office for questions related to their healthcare. Medication reconciliation was performed with patient, who verbalizes understanding of administration of home medications. Advised obtaining a 90-day supply of all daily and as-needed medications. Covid Risk Education     Educated patient about risk for severe COVID-19 due to risk factors according to CDC guidelines. ACM reviewed discharge instructions, medical action plan and red flag symptoms with the patient who verbalized understanding. Discussed COVID vaccination status: No. Education provided on COVID-19 vaccination as appropriate. Discussed exposure protocols and quarantine with CDC Guidelines. Patient was given an opportunity to verbalize any questions and concerns and agrees to contact ACM or health care provider for questions related to their healthcare. Reviewed and educated patient on any new and changed medications related to discharge diagnosis. Was patient discharged with a pulse oximeter? No Discussed and confirmed pulse oximeter discharge instructions and when to notify provider or seek emergency care. ACM provided contact information. Plan for follow-up call in 1-2 days based on severity of symptoms and risk factors.   Plan for next call: follow up appointment-cardiology and endocrinology     Goals      Conditions and Symptoms      I will schedule office visits, as directed by my provider. I will keep my appointment or reschedule if I have to cancel. I will notify my provider of any barriers to my plan of care. I will notify my provider of any symptoms that indicate a worsening of my condition. Barriers: overwhelmed by complexity of regimen and stress  Plan for overcoming my barriers: I will work with my ACM to overcome barriers. Confidence: 8/10  Anticipated Goal Completion Date: 4/1/2022    3/1/2022: Patient has appointment with PCP provider on 3/4/2022. States she needs in [facility] cardiologist and needs to reschedule office visit with Dr. Albert Tate. ACM will schedule these office visits. ACM reviewed red flags and NAL with patient. Follow Up  Future Appointments   Date Time Provider Yousuf Godinez   3/4/2022  9:00 AM Sharon Cipriaon, [facility]   3/28/2022  1:30 PM Nisreen Newman PA-C [facility]     Follow-up With  Details  Why  Contact Info   Saman Gaviria MD    Endo; needs office visit rescheduled  [place] [address], [address] [phone]   In [facility] cardiology    Needs office visit scheduled         Plan:  -ACM to locate and schedule office visit with in [facility] cardiology. -ACM to reschedule office visit with Dr. Albert Tate. -ACM to follow up with patient to notify of appointments. Catie Hahn, MSN RN  Associate Care Manager  Phone: [phone]  Email: [email or web address]

## 2022-03-02 ENCOUNTER — CARE COORDINATION (OUTPATIENT)
Dept: OTHER | Facility: CLINIC | Age: 47
End: 2022-03-02

## 2022-03-04 ENCOUNTER — OFFICE VISIT (OUTPATIENT)
Dept: FAMILY MEDICINE CLINIC | Age: 47
End: 2022-03-04
Payer: COMMERCIAL

## 2022-03-04 ENCOUNTER — CARE COORDINATION (OUTPATIENT)
Dept: OTHER | Facility: CLINIC | Age: 47
End: 2022-03-04

## 2022-03-04 VITALS
SYSTOLIC BLOOD PRESSURE: 102 MMHG | HEART RATE: 81 BPM | DIASTOLIC BLOOD PRESSURE: 68 MMHG | BODY MASS INDEX: 35.98 KG/M2 | WEIGHT: 203.1 LBS | HEIGHT: 63 IN | OXYGEN SATURATION: 97 %

## 2022-03-04 DIAGNOSIS — R00.0 TACHYCARDIA: ICD-10-CM

## 2022-03-04 DIAGNOSIS — E11.10 DIABETIC KETOACIDOSIS WITHOUT COMA ASSOCIATED WITH TYPE 2 DIABETES MELLITUS (HCC): ICD-10-CM

## 2022-03-04 DIAGNOSIS — E11.10 DIABETIC KETOACIDOSIS WITHOUT COMA ASSOCIATED WITH TYPE 2 DIABETES MELLITUS (HCC): Primary | ICD-10-CM

## 2022-03-04 DIAGNOSIS — K21.9 GASTROESOPHAGEAL REFLUX DISEASE, UNSPECIFIED WHETHER ESOPHAGITIS PRESENT: ICD-10-CM

## 2022-03-04 DIAGNOSIS — E10.10 DIABETIC KETOACIDOSIS WITHOUT COMA ASSOCIATED WITH TYPE 1 DIABETES MELLITUS (HCC): ICD-10-CM

## 2022-03-04 DIAGNOSIS — E03.9 HYPOTHYROIDISM, UNSPECIFIED TYPE: ICD-10-CM

## 2022-03-04 DIAGNOSIS — G62.9 NEUROPATHY: ICD-10-CM

## 2022-03-04 DIAGNOSIS — K22.0 ACHALASIA: ICD-10-CM

## 2022-03-04 DIAGNOSIS — M62.82 NON-TRAUMATIC RHABDOMYOLYSIS: ICD-10-CM

## 2022-03-04 DIAGNOSIS — E10.65 UNCONTROLLED TYPE 1 DIABETES MELLITUS WITH HYPERGLYCEMIA (HCC): ICD-10-CM

## 2022-03-04 DIAGNOSIS — G43.111 INTRACTABLE MIGRAINE WITH AURA WITH STATUS MIGRAINOSUS: ICD-10-CM

## 2022-03-04 LAB
A/G RATIO: 1.7 (ref 1.1–2.2)
ALBUMIN SERPL-MCNC: 3.6 G/DL (ref 3.4–5)
ALP BLD-CCNC: 92 U/L (ref 40–129)
ALT SERPL-CCNC: 41 U/L (ref 10–40)
ANION GAP SERPL CALCULATED.3IONS-SCNC: 12 MMOL/L (ref 3–16)
AST SERPL-CCNC: 46 U/L (ref 15–37)
BASOPHILS ABSOLUTE: 0.1 K/UL (ref 0–0.2)
BASOPHILS RELATIVE PERCENT: 2.7 %
BILIRUB SERPL-MCNC: 0.4 MG/DL (ref 0–1)
BUN BLDV-MCNC: 11 MG/DL (ref 7–20)
CALCIUM SERPL-MCNC: 9.5 MG/DL (ref 8.3–10.6)
CHLORIDE BLD-SCNC: 104 MMOL/L (ref 99–110)
CO2: 24 MMOL/L (ref 21–32)
CREAT SERPL-MCNC: 0.7 MG/DL (ref 0.6–1.1)
EOSINOPHILS ABSOLUTE: 0.1 K/UL (ref 0–0.6)
EOSINOPHILS RELATIVE PERCENT: 2.6 %
GFR AFRICAN AMERICAN: >60
GFR NON-AFRICAN AMERICAN: >60
GLUCOSE BLD-MCNC: 176 MG/DL (ref 70–99)
HCT VFR BLD CALC: 37 % (ref 36–48)
HEMOGLOBIN: 12.1 G/DL (ref 12–16)
LYMPHOCYTES ABSOLUTE: 1.5 K/UL (ref 1–5.1)
LYMPHOCYTES RELATIVE PERCENT: 29.8 %
MCH RBC QN AUTO: 31 PG (ref 26–34)
MCHC RBC AUTO-ENTMCNC: 32.7 G/DL (ref 31–36)
MCV RBC AUTO: 94.9 FL (ref 80–100)
MONOCYTES ABSOLUTE: 0.4 K/UL (ref 0–1.3)
MONOCYTES RELATIVE PERCENT: 8.1 %
NEUTROPHILS ABSOLUTE: 2.9 K/UL (ref 1.7–7.7)
NEUTROPHILS RELATIVE PERCENT: 56.8 %
PDW BLD-RTO: 12.7 % (ref 12.4–15.4)
PLATELET # BLD: 247 K/UL (ref 135–450)
PMV BLD AUTO: 10.5 FL (ref 5–10.5)
POTASSIUM SERPL-SCNC: 4.4 MMOL/L (ref 3.5–5.1)
RBC # BLD: 3.9 M/UL (ref 4–5.2)
SODIUM BLD-SCNC: 140 MMOL/L (ref 136–145)
TOTAL CK: 41 U/L (ref 26–192)
TOTAL PROTEIN: 5.7 G/DL (ref 6.4–8.2)
WBC # BLD: 5.1 K/UL (ref 4–11)

## 2022-03-04 PROCEDURE — 1111F DSCHRG MED/CURRENT MED MERGE: CPT | Performed by: STUDENT IN AN ORGANIZED HEALTH CARE EDUCATION/TRAINING PROGRAM

## 2022-03-04 PROCEDURE — 99214 OFFICE O/P EST MOD 30 MIN: CPT | Performed by: STUDENT IN AN ORGANIZED HEALTH CARE EDUCATION/TRAINING PROGRAM

## 2022-03-04 RX ORDER — FAMOTIDINE 20 MG/1
20 TABLET, FILM COATED ORAL DAILY
Qty: 90 TABLET | Refills: 1 | Status: SHIPPED | OUTPATIENT
Start: 2022-03-04 | End: 2022-08-31

## 2022-03-04 RX ORDER — PREGABALIN 300 MG/1
CAPSULE ORAL
COMMUNITY
Start: 2022-01-18 | End: 2022-03-28

## 2022-03-04 RX ORDER — MULTIVITAMIN
1 CAPSULE ORAL NIGHTLY
COMMUNITY

## 2022-03-04 RX ORDER — RIZATRIPTAN BENZOATE 10 MG/1
10 TABLET ORAL PRN
COMMUNITY
Start: 2021-08-05 | End: 2022-03-28 | Stop reason: SDUPTHER

## 2022-03-04 RX ORDER — OMEPRAZOLE 40 MG/1
CAPSULE, DELAYED RELEASE ORAL
COMMUNITY
Start: 2021-12-01 | End: 2022-03-28

## 2022-03-04 RX ORDER — PROMETHAZINE HYDROCHLORIDE 25 MG/1
25 TABLET ORAL EVERY 6 HOURS PRN
Qty: 90 TABLET | Refills: 2 | Status: SHIPPED | OUTPATIENT
Start: 2022-03-04 | End: 2022-04-03

## 2022-03-04 RX ORDER — TETRACYCLINE HCL 500 MG
500 CAPSULE ORAL DAILY
COMMUNITY

## 2022-03-04 RX ORDER — LANCETS
EACH MISCELLANEOUS
COMMUNITY
Start: 2022-01-14

## 2022-03-04 RX ORDER — BIOTIN 1 MG
1000 TABLET ORAL DAILY
COMMUNITY

## 2022-03-04 RX ORDER — ACETAMINOPHEN 325 MG/1
650 TABLET ORAL EVERY 4 HOURS PRN
COMMUNITY

## 2022-03-04 RX ORDER — OMEPRAZOLE 40 MG/1
40 CAPSULE, DELAYED RELEASE ORAL NIGHTLY
Qty: 90 CAPSULE | Refills: 0 | Status: SHIPPED | OUTPATIENT
Start: 2022-03-04 | End: 2022-04-25 | Stop reason: SDUPTHER

## 2022-03-04 ASSESSMENT — ENCOUNTER SYMPTOMS
NAUSEA: 0
SHORTNESS OF BREATH: 0
WHEEZING: 0
SORE THROAT: 0
ABDOMINAL PAIN: 0

## 2022-03-04 NOTE — CARE COORDINATION
Nyla 45 Transitions Follow Up Call    3/4/2022    Patient: Romana Sacramento  Patient : 1975   MRN: [id number]  Reason for Admission: FRANK, DKA  Discharge Date: 2022  RARS: Readmission Risk Score: 11 ( )       ACM attempted to reach patient for follow up call. HIPAA compliant message left requesting a return phone call at patients convenience. Will continue to follow. Addendum: ACM received return voicemail from patient this date stating she had appointment with Dr. Anisha Burns earlier today. States she has not heard back from Dr. Robert Mayfield office to schedule office visit yet. Patient notified ACM via voicemail that she also called Dr. Robert Mayfield office to schedule but had to leave voicemail. Patient states Dr. Anisha Burns sent referral to Dr. Robert Mayfield office with office visit today as patient needs follow up asap. Patient states she has cardiology office visit 3/9/22 and will update ACM after that appt. Follow Up  Future Appointments   Date Time Provider Yousuf Godinez   3/9/2022 10:15 AM Teja Girard MD [facility]   3/28/2022  1:30 PM Jenny Newell PA-C [facility]     Goals      Conditions and Symptoms      I will schedule office visits, as directed by my provider. I will keep my appointment or reschedule if I have to cancel. I will notify my provider of any barriers to my plan of care. I will notify my provider of any symptoms that indicate a worsening of my condition. Barriers: overwhelmed by complexity of regimen and stress  Plan for overcoming my barriers: I will work with my ACM to overcome barriers. Confidence: 8/10  Anticipated Goal Completion Date: 2022    3/1/2022: Patient has appointment with PCP provider on 3/4/2022. States she needs in network cardiologist and needs to reschedule office visit with Dr. Glendy Pelaez. AC will schedule these office visits. ACM reviewed red flags and NAL with patient. 3/4/2022: Patient had appt with PCP provider this date.  Still waiting follow up with Dr. Glendy Pelaez. [facility] PCP sent new referral to Dr. Glendy Pelaez as patient needs follow up asap. Nyasia Sandy MSN RN  Associate Care Manager  Phone: [phone]  Email: [email or web address]

## 2022-03-04 NOTE — PROGRESS NOTES
Post-Discharge Transitional Care Follow Up      Varghese Dalton   YOB: 1975    Date of Office Visit:  3/4/2022  Date of Hospital Admission: 1/7/22  Date of Hospital Discharge: 1/7/22  Readmission Risk Score (high >=14%. Medium >=10%):Readmission Risk Score: 11 ( )      Care management risk score Rising risk (score 2-5) and Complex Care (Scores >=6): 1     Non face to face  following discharge, date last encounter closed (first attempt may have been earlier): 3/1/2022  8:27 PM     Call initiated 2 business days of discharge: No     Diabetic ketoacidosis without coma associated with type 2 diabetes mellitus ([facility] [address]  -     VA DISCHARGE MEDS RECONCILED W/ CURRENT OUTPATIENT MED LIST  -     External Referral To Endocrinology  -     CBC with Auto Differential; Future  -     Comprehensive Metabolic Panel; Future  Uncontrolled type 1 diabetes mellitus with hyperglycemia ([facility] [address]  -     External Referral To Endocrinology  -     CBC with Auto Differential; Future  -     Comprehensive Metabolic Panel; Future  Tachycardia  Diabetic ketoacidosis without coma associated with type 1 diabetes mellitus (HCC)  Hypothyroidism, unspecified type  Achalasia  Intractable migraine with aura with status migrainosus  Neuropathy  Gastroesophageal reflux disease, unspecified whether esophagitis present  -     omeprazole (PRILOSEC) 40 MG delayed release capsule; Take 1 capsule by mouth nightly, Disp-90 capsule, R-0Normal  -     famotidine (PEPCID) 20 MG tablet; Take 1 tablet by mouth daily, Disp-90 tablet, R-1Normal  -     promethazine (PHENERGAN) 25 MG tablet; Take 1 tablet by mouth every 6 hours as needed for Nausea, Disp-90 tablet, R-2Normal  Non-traumatic rhabdomyolysis  -     CK; Future  -     ALDOLASE; Future      Medical Decision Making: straightforward  Return in about 4 weeks (around 4/1/2022) for patient establish with [facility].     On this date 3/4/2022 I have spent 30 minutes reviewing previous notes, test results and face to face with the patient discussing the diagnosis and importance of compliance with the treatment plan as well as documenting on the day of the visit.     -DM1 controlled, gap closed  -repeat CK, aldolase WNL  -plan to follow up with Dr. Ben Kramer will send referral  -rhabdomyolysis resolved, good UOP    Admit date: 2/17/2022    Discharge date : 2/21/2022         Subjective:   HPI    49 yo F presents for follow up recent hospital discharge [facility] 2/17-/2/21/2022  Admitted for DKA started on insulin drip, IVF, bicarb drip. 2/18 gap closed. Patient also treaded for AKD IVF. Discharged on norco prn acutely, change of synthroid to 88 mcg, lisinopril to 20mg. Since discharge glucose has renee excellent 110-180. She feels she was in DKA as insulin pump was empty. Stopped at 6:40AM 2 days prior to hospitalizaion    Inpatient course: Discharge summary reviewed- see chart. Interval history/Current status: Stable    Hx. DM1 brought  1 Day prior to hospitalization had n,v. DKA with frank was down . She states likely what happened wass insulin pump was empty. Insulin pump stopped delivering roughly 6:40 AM 2 days prior to hospitalization  She feels she went into DKA due to viral illness/machine error  Last time occurring 2014    Since dischage range usually 110-180      Appointment with Ben Kramer pending. Prior Endocrinologist [address] [place] [address],Third Floor Endocrinology however due to change in insurance no longer in coverage.      Hx. migraines    Plan to [facility] Dr. Diego Kinsey       Patient Active Problem List   Diagnosis    Pyelonephritis    Renal calculi    Diabetic ketoacidosis without coma associated with type 1 diabetes mellitus ([facility] [address]    Tachycardia    Hypothyroidism    Achalasia    Uncontrolled type 1 diabetes mellitus with hyperglycemia (HCC)    Intractable migraine with aura with status migrainosus    Neuropathy       Medications listed as ordered at the time of discharge from hospital  [unfilled] Medications marked \"taking\" at this time  Outpatient Medications Marked as Taking for the 3/4/22 encounter (Office Visit) with Nitin Edith, DO   Medication Sig Dispense Refill    acetaminophen (TYLENOL) 325 MG tablet Take 650 mg by mouth every 4 hours as needed      Apple Cider Vinegar 500 MG TABS       B Complex-Biotin-FA (SUPER B-COMPLEX PO)       Biotin 1000 MCG TABS       CALCIUM CITRATE PO  (Patient not taking: Reported on 3/9/2022)      Glucagon 1 MG/0.2ML SOSY Inject 1 Syringe into the skin as needed      insulin lispro (HUMALOG) 100 UNIT/ML injection vial USE UP TO 75 UNITS DAILY      Insulin Pump Accessories MISC by Unknown route.  (Patient not taking: Reported on 3/9/2022)      Accu-Chek FastClix Lancets MISC       Multiple Vitamin (MULTIVITAMIN) capsule Take 1 capsule by mouth nightly      Multiple Vitamins-Iron (MULTIVITAMIN/IRON PO)  (Patient not taking: Reported on 3/9/2022)      pregabalin (LYRICA) 300 MG capsule  (Patient not taking: Reported on 3/9/2022)      rizatriptan (MAXALT) 10 MG tablet Take 10 mg by mouth as needed      omeprazole (PRILOSEC) 40 MG delayed release capsule  (Patient not taking: Reported on 3/9/2022)      omeprazole (PRILOSEC) 40 MG delayed release capsule Take 1 capsule by mouth nightly 90 capsule 0    famotidine (PEPCID) 20 MG tablet Take 1 tablet by mouth daily 90 tablet 1    promethazine (PHENERGAN) 25 MG tablet Take 1 tablet by mouth every 6 hours as needed for Nausea 90 tablet 2    [DISCONTINUED] metoprolol tartrate (LOPRESSOR) 25 MG tablet Take 25 mg by mouth daily      Multiple Vitamin (MULTIVITAMIN ADULT PO) Take 1 tablet by mouth daily (Patient not taking: Reported on 3/9/2022)      Cyanocobalamin (VITAMIN B12 PO) Take 1 tablet by mouth daily      CALCIUM PO Take 1 tablet by mouth daily      Cholecalciferol (VITAMIN D-3 PO) Take 1 tablet by mouth daily      HYDROcodone-acetaminophen (NORCO) 5-325 MG per tablet Take 1 tablet by mouth every 6 hours as needed for Pain. Has 1.5 tablets remaining from 2/21/22 RX (Patient not taking: Reported on 3/9/2022)      ondansetron (ZOFRAN-ODT) 4 MG disintegrating tablet Take 1 tablet by mouth every 8 hours as needed for Nausea or Vomiting (Patient not taking: Reported on 3/9/2022) 30 tablet 0    Semaglutide, 1 MG/DOSE, 2 MG/1.5ML SOPN Inject 1 mg into the skin once a week Takes on Tuesdays      [DISCONTINUED] metoprolol succinate (TOPROL XL) 25 MG extended release tablet Take 25 mg by mouth daily (Patient not taking: Reported on 3/9/2022)      levothyroxine (SYNTHROID) 150 MCG tablet Take 150 mcg by mouth Daily      pregabalin (LYRICA) 150 MG capsule Take 300 mg by mouth 2 times daily.  simvastatin (ZOCOR) 20 MG tablet Take 20 mg by mouth nightly      aspirin 81 MG EC tablet Take 81 mg by mouth daily      Insulin Pump - Insulin regular Inject into the skin continuous Insulin-to-Carb Ratio (ICR): Insulin Sensitivity Factor (ISF):   Target Blood Glucose: Bolus Frequency:   Humalog    Insulin pump with CGM      sertraline (ZOLOFT) 100 MG tablet Take 150 mg by mouth daily      [DISCONTINUED] lisinopril (PRINIVIL;ZESTRIL) 20 MG tablet Take 20 mg by mouth daily          Medications patient taking as of now reconciled against medications ordered at time of hospital discharge: Yes    Review of Systems   Constitutional: Negative for chills and fatigue. HENT: Negative for congestion and sore throat. Respiratory: Negative for shortness of breath and wheezing. Cardiovascular: Negative for chest pain and palpitations. Gastrointestinal: Negative for abdominal pain and nausea. Genitourinary: Negative for frequency and urgency. Neurological: Negative for light-headedness.        Objective:    /68 (Site: Left Upper Arm, Position: Sitting, Cuff Size: Medium Adult)   Pulse 81   Ht 5' 3\" (1.6 m)   Wt 203 lb 1.6 oz (92.1 kg)   SpO2 97%   BMI 35.98 kg/m²   Physical Exam  Constitutional: Appearance: Normal appearance. HENT:      Head: Normocephalic and atraumatic. Eyes:      Extraocular Movements: Extraocular movements intact. Pupils: Pupils are equal, round, and reactive to light. Cardiovascular:      Rate and Rhythm: Normal rate and regular rhythm. Pulses: Normal pulses. Heart sounds: No murmur heard. No friction rub. No gallop. Pulmonary:      Effort: Pulmonary effort is normal.      Breath sounds: Normal breath sounds. Musculoskeletal:      Cervical back: Neck supple. Skin:     General: Skin is warm and dry. Neurological:      General: No focal deficit present. Mental Status: She is alert. Psychiatric:         Mood and Affect: Mood normal.         Behavior: Behavior normal.         An electronic signature was used to authenticate this note.   --Stephanie Elaine, DO

## 2022-03-09 ENCOUNTER — OFFICE VISIT (OUTPATIENT)
Dept: CARDIOLOGY CLINIC | Age: 47
End: 2022-03-09
Payer: COMMERCIAL

## 2022-03-09 VITALS
WEIGHT: 202 LBS | BODY MASS INDEX: 35.79 KG/M2 | HEIGHT: 63 IN | SYSTOLIC BLOOD PRESSURE: 100 MMHG | DIASTOLIC BLOOD PRESSURE: 60 MMHG

## 2022-03-09 DIAGNOSIS — R00.2 PALPITATIONS: ICD-10-CM

## 2022-03-09 DIAGNOSIS — R77.8 ELEVATED TROPONIN: ICD-10-CM

## 2022-03-09 DIAGNOSIS — R55 SYNCOPE AND COLLAPSE: ICD-10-CM

## 2022-03-09 DIAGNOSIS — I10 ESSENTIAL HYPERTENSION: ICD-10-CM

## 2022-03-09 DIAGNOSIS — R42 DIZZY: Primary | ICD-10-CM

## 2022-03-09 DIAGNOSIS — E66.09 CLASS 2 OBESITY DUE TO EXCESS CALORIES WITHOUT SERIOUS COMORBIDITY WITH BODY MASS INDEX (BMI) OF 35.0 TO 35.9 IN ADULT: ICD-10-CM

## 2022-03-09 DIAGNOSIS — R06.02 SHORTNESS OF BREATH: ICD-10-CM

## 2022-03-09 LAB — ALDOLASE: 5.7 U/L (ref 1.2–7.6)

## 2022-03-09 PROCEDURE — 93000 ELECTROCARDIOGRAM COMPLETE: CPT | Performed by: INTERNAL MEDICINE

## 2022-03-09 PROCEDURE — 99204 OFFICE O/P NEW MOD 45 MIN: CPT | Performed by: INTERNAL MEDICINE

## 2022-03-09 RX ORDER — METOPROLOL SUCCINATE 25 MG/1
25 TABLET, EXTENDED RELEASE ORAL DAILY
Qty: 90 TABLET | Refills: 3 | Status: CANCELLED | OUTPATIENT
Start: 2022-03-09

## 2022-03-09 RX ORDER — LISINOPRIL 5 MG/1
5 TABLET ORAL DAILY
Qty: 90 TABLET | Refills: 1 | Status: SHIPPED | OUTPATIENT
Start: 2022-03-09 | End: 2022-04-06 | Stop reason: SDUPTHER

## 2022-03-09 RX ORDER — METOPROLOL SUCCINATE 25 MG/1
25 TABLET, EXTENDED RELEASE ORAL DAILY
Qty: 90 TABLET | Refills: 3 | Status: SHIPPED | OUTPATIENT
Start: 2022-03-09 | End: 2022-04-06 | Stop reason: SDUPTHER

## 2022-03-09 NOTE — PROGRESS NOTES
Justen Herrera MD                                  CARDIOLOGY  NOTE      Chief Complaint:    Chief Complaint   Patient presents with    New Patient     Pt denies CP, SOB Edema she does state dizziness with position change it last seconds, no surgeries procedures schedule dthat she is awar eof    Dizziness    Loss of Consciousness    Fatigue        HPI:     Bernardino Yates is a 48y.o. year old female, who is a registered nurse at [employer], presents to the clinic with chief complaint of dyspnea, palpitations, syncope and fatigue. Patient has been diagnosed previously with palpitations at [facility]. Few weeks back patient underwent Holter monitor which revealed tachycardia, and patient was started on beta-blockers. Patient also has prior medical history significant for hypertension hyperlipidemia diabetes mellitus insulin-dependent     In February, patient had an episode of diabetic ketoacidosis severe rhabdomyolysis. She was unresponsive on the floor at home by herself and was escorted to the nearest hospital/[place] for management of diabetic ketoacidosis. Patient mention her sugar level was over 1000 and probably her insulin pump was not functioning. Patient was also noted to have elevated troponin during the hospitalization, which was positive delta change. Patient denies any prior established history of CAD CHF or arrhythmia other than tachycardia 2 years ago. She is a non-smoker, denies any alcohol or drug abuse. She is drinks socially alcohol/wine once in a while. Family history significant for atrial fibrillation in her mother which was undiagnosed fairly late. Patient mother had 2 strokes second one fatal in her 62s. Patient denies any resting chest pain or palpitations  Complains of occasional dyspnea    EKG in the office today shows sinus rhythm at 90 bpm no ischemic changes noted otherwise.              Current Outpatient Medications   Medication Sig Dispense Refill    metoprolol succinate (TOPROL XL) 25 MG extended release tablet Take 1 tablet by mouth daily 90 tablet 3    lisinopril (PRINIVIL;ZESTRIL) 5 MG tablet Take 1 tablet by mouth daily 90 tablet 1    acetaminophen (TYLENOL) 325 MG tablet Take 650 mg by mouth every 4 hours as needed      Apple Cider Vinegar 500 MG TABS       B Complex-Biotin-FA (SUPER B-COMPLEX PO)       Biotin 1000 MCG TABS       Glucagon 1 MG/0.2ML SOSY Inject 1 Syringe into the skin as needed      insulin lispro (HUMALOG) 100 UNIT/ML injection vial USE UP TO 75 UNITS DAILY      Accu-Chek FastClix Lancets MISC       Multiple Vitamin (MULTIVITAMIN) capsule Take 1 capsule by mouth nightly      rizatriptan (MAXALT) 10 MG tablet Take 10 mg by mouth as needed      omeprazole (PRILOSEC) 40 MG delayed release capsule Take 1 capsule by mouth nightly 90 capsule 0    famotidine (PEPCID) 20 MG tablet Take 1 tablet by mouth daily 90 tablet 1    promethazine (PHENERGAN) 25 MG tablet Take 1 tablet by mouth every 6 hours as needed for Nausea 90 tablet 2    Cyanocobalamin (VITAMIN B12 PO) Take 1 tablet by mouth daily      CALCIUM PO Take 1 tablet by mouth daily      Cholecalciferol (VITAMIN D-3 PO) Take 1 tablet by mouth daily      Semaglutide, 1 MG/DOSE, 2 MG/1.5ML SOPN Inject 1 mg into the skin once a week Takes on Tuesdays      levothyroxine (SYNTHROID) 150 MCG tablet Take 150 mcg by mouth Daily      pregabalin (LYRICA) 150 MG capsule Take 300 mg by mouth 2 times daily.  simvastatin (ZOCOR) 20 MG tablet Take 20 mg by mouth nightly      aspirin 81 MG EC tablet Take 81 mg by mouth daily      Insulin Pump - Insulin regular Inject into the skin continuous Insulin-to-Carb Ratio (ICR): Insulin Sensitivity Factor (ISF):   Target Blood Glucose:    Bolus Frequency:   Humalog    Insulin pump with CGM      sertraline (ZOLOFT) 100 MG tablet Take 150 mg by mouth daily      CALCIUM CITRATE PO  (Patient not taking: Reported on 3/9/2022)      Insulin Pump Accessories MISC by Unknown route. (Patient not taking: Reported on 3/9/2022)      Multiple Vitamins-Iron (MULTIVITAMIN/IRON PO)  (Patient not taking: Reported on 3/9/2022)      pregabalin (LYRICA) 300 MG capsule  (Patient not taking: Reported on 3/9/2022)      omeprazole (PRILOSEC) 40 MG delayed release capsule  (Patient not taking: Reported on 3/9/2022)      Multiple Vitamin (MULTIVITAMIN ADULT PO) Take 1 tablet by mouth daily (Patient not taking: Reported on 3/9/2022)      HYDROcodone-acetaminophen (NORCO) 5-325 MG per tablet Take 1 tablet by mouth every 6 hours as needed for Pain. Has 1.5 tablets remaining from 2/21/22 RX (Patient not taking: Reported on 3/9/2022)      ondansetron (ZOFRAN-ODT) 4 MG disintegrating tablet Take 1 tablet by mouth every 8 hours as needed for Nausea or Vomiting (Patient not taking: Reported on 3/9/2022) 30 tablet 0     No current facility-administered medications for this visit.        Allergies:     Dilaudid [hydromorphone hcl] and Metformin and related    Patient History:    Past Medical History:   Diagnosis Date    Depression     Diabetes mellitus ([facility] [address]     GERD (gastroesophageal reflux disease)     Hypertension     PONV (postoperative nausea and vomiting)     Thyroid disease      Past Surgical History:   Procedure Laterality Date    APPENDECTOMY  1989    BLADDER SURGERY Left 1/7/2022    CYSTOSCOPY RETROGRADE PYELOGRAM URETERAL STENT REMOVAL performed by Hernán Devine MD at [facility] Left 12/28/2021    CYSTOSCOPY LEFT RETROGRADE PYLEOGRAMS Stent placement performed by Oleg Smith MD at [address]  2014    HYSTERECTOMY, TOTAL ABDOMINAL  2017    KIDNEY STONE SURGERY  2017    LITHOTRIPSY Left 1/7/2022    LEFT CYSTOSCOPY URETEROSCOPY RETROGRADE PYELOGRAM STONE MANIPULATION WITH HOLIUM LASER LITHOTRIPSY performed by Hernán Devine MD at [facility]  2017    TONSILLECTOMY  1997     Family History   Problem Relation Age of Onset    Hypertension Mother     High Cholesterol Mother     Arrhythmia Mother      Social History     Tobacco Use    Smoking status: Never Smoker    Smokeless tobacco: Never Used   Substance Use Topics    Alcohol use: Not Currently     Comment: occ/caffeine 1 coffee a day        Review of Systems:     · Constitutional:  No Fever or Weight Loss   · Eyes: No Decreased Vision  · ENT: No Headaches, Hearing Loss or Vertigo  · Cardiovascular: No Chest Pain,  + Shortness of breath, No Palpitations. No Edema   · Respiratory: No cough or wheezing . No Respiratory distress   · Gastrointestinal: No abdominal pain, appetite loss, blood in stools, constipation, diarrhea or heartburn  · Genitourinary: No dysuria, trouble voiding, or hematuria  · Musculoskeletal:  denies any new  joint aches , or pain   · Integumentary: No rash or pruritis  · Neurological: No TIA or stroke symptoms  · Psychiatric: No anxiety or depression  · Endocrine: No malaise, fatigue or temperature intolerance  · Hematologic/Lymphatic: No bleeding problems, blood clots or swollen lymph nodes  · Allergic/Immunologic: No nasal congestion or hives        Objective:      Physical Exam:    /60 (Position: Standing)   Ht 5' 3\" (1.6 m)   Wt 202 lb (91.6 kg)   BMI 35.78 kg/m²   Wt Readings from Last 3 Encounters:   03/09/22 202 lb (91.6 kg)   03/04/22 203 lb 1.6 oz (92.1 kg)   01/06/22 197 lb (89.4 kg)     Body mass index is 35.78 kg/m². Vitals:    03/09/22 1040   BP: 100/60        General Appearance and Constitutional: Conversant, Well developed, Well nourished, No acute distress, Non-toxic appearance. HEENT:  Normocephalic, Atraumatic, Bilateral external ears normal, Oropharynx moist, No oral exudates,   Nose normal.   Neck- Normal range of motion, No tenderness, Supple  Eyes:  EOMI, Conjunctiva normal, No discharge.    Respiratory:  Normal breath sounds, No respiratory distress, No wheezing, No Rales, No Ronchi. No chest tenderness. Cardiovascular: S1-S2, no added heart sounds, No Mumurs appreciated. No gallops, rubs. No Pedal Edema   GI:  Bowel sounds normal, Soft, No tenderness,  :  No costovertebral angle tenderness   Musculoskeletal:  No gross deformities. Back- No tenderness  Integument:  Well hydrated, no rash   Lymphatic:  No lymphadenopathy noted   Neurologic:  Alert & oriented x 3, Normal motor function, normal sensory function, no focal deficits noted   Psychiatric:  Speech and behavior appropriate       Medical decision making and Data review:    DATA:    No results found for: TROPONINT  BNP:  No results found for: PROBNP  PT/INR:  No results found for: PTINR  Lab Results   Component Value Date    LABA1C 7.7 11/08/2021     Lab Results   Component Value Date    CHOL 155 06/24/2021    TRIG 92 06/24/2021    HDL 67 06/24/2021    LDLCALC 70 06/24/2021     Lab Results   Component Value Date    WBC 5.1 03/04/2022    HGB 12.1 03/04/2022    HCT 37.0 03/04/2022    MCV 94.9 03/04/2022     03/04/2022     TSH: No results found for: TSH  Lab Results   Component Value Date    AST 46 (H) 03/04/2022    ALT 41 (H) 03/04/2022    BILITOT 0.4 03/04/2022    ALKPHOS 92 03/04/2022         All labs, medications and tests reviewed by myself including data and history from outside source , patient and available family . 1. Dizzy    2. Palpitations    3. Shortness of breath    4. Syncope and collapse    5. Essential hypertension    6. Elevated troponin    7. Class 2 obesity due to excess calories without serious comorbidity with body mass index (BMI) of 35.0 to 35.9 in adult         Impression and Plan:      1. Syncope:    Likely secondary to diabetic ketoacidosis with severe rhabdomyolysis requiring ICU admission  Will obtain echocardiogram to rule out structural heart disease    2. Elevated troponin during hospitalization: Likely secondary to rhabdomyolysis/type II MI.   However, need to rule out ischemic heart disease specially in context of type 1 diabetes mellitus -obtain Cardiolite MPI   3. History of palpitations, family history of atrial fibrillation  Obtain 7-day event monitor on beta-blocker  Resume metoprolol XL 25 mg daily patient has been out of it for the past several days    4. Essential hypertension:  Resume metoprolol 25 mg p.o. daily  Blood pressure is borderline low today in the office. Reduce dose of lisinopril from 20 mg to 5 mg. Lisinopril primarily being used for renal protective in the setting of diabetes    5. Insulin-dependent diabetes mellitus: Follow-up with endocrinology closely as patient had at least 2 episodes of DKA with syncope in the last 10 years  6. Hyperlipidemia: Continue with simvastatin 20 mg daily  7. Obesity with BMI of 35.78: Low-salt low-fat diet and exercise as tolerated         Return in about 1 month (around 4/9/2022). Counseled extensively and medication compliance urged. We discussed that for the  prevention of ASCVD our  goal is aggressive risk modification. Patient is encouraged to exercise even a brisk walk for 30 minutes  at least 3 to 4 times a week   Various goals were discussed and questions answered. Continue current medications. Appropriate prescriptions are addressed and refills ordered. Questions answered and patient verbalizes understanding. Call for any problems, questions, or concerns.

## 2022-03-10 ENCOUNTER — CARE COORDINATION (OUTPATIENT)
Dept: OTHER | Facility: CLINIC | Age: 47
End: 2022-03-10

## 2022-03-10 NOTE — CARE COORDINATION
Nyla 45 Transitions Follow Up Call    3/10/2022    Patient: Anuradha Bhagat  Patient : 1975   MRN: [id number]  Reason for Admission: FRANK, DKA  Discharge Date: 2022  RARS: Readmission Risk Score: 11 ( )         Spoke with: Anuradha Bhagat, patient    Care Transitions Subsequent and Final Call    Schedule Follow Up Appointment with PCP: Completed  Subsequent and Final Calls  Do you have any ongoing symptoms?: No  Have your medications changed?: Yes  Patient Reports: Dr. Elias Piedra decreased Lisinopril to 5 mg once daily; Metoprolol XL 25 mg once daily  Do you have any questions related to your medications?: No  Do you currently have any active services?: No  Do you have any needs or concerns that I can assist you with?: Yes  Patient-reported Needs or Concerns: needs endo office visit  Identified Barriers: Stress  Care Transitions Interventions  Other Interventions:         Care Transitions Follow Up Call    Needs to be reviewed by the provider   Additional needs identified to be addressed with provider: Yes  needs NP appointment with Dr. Nataliia Velasquez of communication with provider : phone- ACM called Dr. Gabe Virk office and got new patient appointment scheduled 3/14/22 @ [phone]. Patient notified. Care Transition Nurse (CTN) contacted the patient by telephone to follow up after admission on 2022. Verified name and  with patient as identifiers. Patient states had initial office visit with cardiology yesterday and now scheduled for OP testing (stress test, echo, & 7 day event monitor). States she notified cardiology of intermittent dizziness and Lisinopril dose was decreased to 5 mg. States started lower dose today. Patient reports BP was low at office visit yesterday. States she resumed driving self yesterday. Reports fatigue and poor endurance after going to provider office visit and pharmacy after.  States getting frustrated that she is not bouncing back as quickly as she did resources available to patient including: PCP, Specialist and Benefits related nurse triage line. The patient agrees to contact the PCP office for questions related to their healthcare. Patients top risk factors for readmission: functional physical ability  medical condition-patient recently hospitalized for DKA, rhabdo  multiple [facility] system providers  PCP relationship  polypharmacy  stages of grief  caregiver stress  Interventions to address risk factors: Scheduled appointment with Specialist-Dr. Delores Paris 3/14/22 and Assessment and support for treatment adherence and medication management-reinforced importance of taking medications as prescribed and not running out of meds      [facility] follow up appointment(s): See below    CTN provided contact information for future needs. Plan for follow-up call in 7-10 days based on severity of symptoms and risk factors. Plan for next call: follow up appointment-Dr. Delores Paris 3/14/22    Goals      Conditions and Symptoms      I will schedule office visits, as directed by my provider. I will keep my appointment or reschedule if I have to cancel. I will notify my provider of any barriers to my plan of care. I will notify my provider of any symptoms that indicate a worsening of my condition. Barriers: overwhelmed by complexity of regimen and stress  Plan for overcoming my barriers: I will work with my ACM to overcome barriers. Confidence: 8/10  Anticipated Goal Completion Date: 4/1/2022    3/1/2022: Patient has appointment with PCP provider on 3/4/2022. States she needs in network cardiologist and needs to reschedule office visit with Dr. Delores Paris. [facility] will schedule these office visits. ACM reviewed red flags and NAL with patient. 3/4/2022: Patient had appt with PCP provider this date. Still waiting follow up with Dr. Deloers Paris. States PCP sent new referral to Dr. Delores Paris as patient needs follow up asap. 3/10/2022: Patient had cardiology office visit yesterday.  Now has appointment with Dr. Laura Baird 3/14/22. Follow Up  Future Appointments   Date Time Provider Yousuf Godinez   3/25/2022  2:15 PM SCHEDULE, [facility] VASCULAR [facility]   3/25/2022  3:00 PM SCHEDULE, [address] [facility] SP[facility] TST [facility]   3/28/2022 11:30 AM SCHEDULE, [facility] NUCLEAR [facility]   3/28/2022  1:30 PM Lorena Jimenez [address] [facility] FPS [facility]   4/6/2022  2:00 PM [address] MD [facility]     Follow-up With  Details  Why  Contact Info   Andrew Alcocer MD  On 3/14/2022  Endo new patient appointment @ 3:15 pm  Ke[facility] , [address] [place] [address] Drive  [phone]     Yaz Soler, ROSA RN  Associate Care Manager  Phone: [phone]  Email: [email or web address]

## 2022-03-21 ENCOUNTER — CARE COORDINATION (OUTPATIENT)
Dept: OTHER | Facility: CLINIC | Age: 47
End: 2022-03-21

## 2022-03-21 NOTE — CARE COORDINATION
Nyla 45 Transitions Follow Up Call    3/21/2022    Patient: Stephanie Bernal  Patient : 1975   MRN: [id number]  Reason for Admission: FRANK, DKA  Discharge Date: 2022  RARS: Readmission Risk Score: 11 ( )    ACM attempted to reach patient for follow up call regarding DM office follow up and current symptoms. HIPAA compliant message left requesting a return phone call at patients convenience. Will continue to follow. Follow Up  Future Appointments   Date Time Provider Yousuf Godinez   3/25/2022  2:15 PM SCHEDULE, [facility] VASCULAR [facility]   3/25/2022  3:00 PM SCHEDULE, [facility] SPFLD TST [facility]   3/28/2022 11:30 AM SCHEDULE, [facility] NUCLEAR [facility]   3/28/2022  1:30 PM Missy Moise [facility] FPS [facility]   2022  2:00 PM Gisselle Wakefiled MD [facility]       Vasiliy Lewis, MSN RN  Associate Care Manager  Phone: [phone]  Email: [email or web address]

## 2022-03-28 ENCOUNTER — PROCEDURE VISIT (OUTPATIENT)
Dept: CARDIOLOGY CLINIC | Age: 47
End: 2022-03-28
Payer: COMMERCIAL

## 2022-03-28 ENCOUNTER — OFFICE VISIT (OUTPATIENT)
Dept: FAMILY MEDICINE CLINIC | Age: 47
End: 2022-03-28
Payer: COMMERCIAL

## 2022-03-28 ENCOUNTER — NURSE ONLY (OUTPATIENT)
Dept: CARDIOLOGY CLINIC | Age: 47
End: 2022-03-28
Payer: COMMERCIAL

## 2022-03-28 VITALS
BODY MASS INDEX: 35.45 KG/M2 | WEIGHT: 200.1 LBS | DIASTOLIC BLOOD PRESSURE: 84 MMHG | HEIGHT: 63 IN | HEART RATE: 80 BPM | SYSTOLIC BLOOD PRESSURE: 124 MMHG | OXYGEN SATURATION: 95 %

## 2022-03-28 DIAGNOSIS — R00.0 TACHYCARDIA: ICD-10-CM

## 2022-03-28 DIAGNOSIS — R00.2 PALPITATIONS: ICD-10-CM

## 2022-03-28 DIAGNOSIS — R42 DIZZY: ICD-10-CM

## 2022-03-28 DIAGNOSIS — Z76.89 ENCOUNTER TO ESTABLISH CARE: Primary | ICD-10-CM

## 2022-03-28 DIAGNOSIS — R06.02 SHORTNESS OF BREATH: ICD-10-CM

## 2022-03-28 DIAGNOSIS — R94.31 ABNORMAL EKG: ICD-10-CM

## 2022-03-28 DIAGNOSIS — E10.10 DIABETIC KETOACIDOSIS WITHOUT COMA ASSOCIATED WITH TYPE 1 DIABETES MELLITUS (HCC): ICD-10-CM

## 2022-03-28 DIAGNOSIS — Z98.84 STATUS POST GASTRIC BYPASS FOR OBESITY: ICD-10-CM

## 2022-03-28 DIAGNOSIS — R77.8 ELEVATED TROPONIN: ICD-10-CM

## 2022-03-28 DIAGNOSIS — I10 PRIMARY HYPERTENSION: ICD-10-CM

## 2022-03-28 DIAGNOSIS — R06.02 SOB (SHORTNESS OF BREATH): Primary | ICD-10-CM

## 2022-03-28 DIAGNOSIS — R55 SYNCOPE AND COLLAPSE: ICD-10-CM

## 2022-03-28 DIAGNOSIS — K22.0 ACHALASIA: ICD-10-CM

## 2022-03-28 DIAGNOSIS — E03.9 HYPOTHYROIDISM, UNSPECIFIED TYPE: ICD-10-CM

## 2022-03-28 DIAGNOSIS — G43.909 MIGRAINE WITHOUT STATUS MIGRAINOSUS, NOT INTRACTABLE, UNSPECIFIED MIGRAINE TYPE: ICD-10-CM

## 2022-03-28 DIAGNOSIS — Z87.442 HISTORY OF KIDNEY STONES: ICD-10-CM

## 2022-03-28 DIAGNOSIS — L40.50 PSORIATIC ARTHRITIS (HCC): ICD-10-CM

## 2022-03-28 DIAGNOSIS — Z11.59 NEED FOR HEPATITIS C SCREENING TEST: ICD-10-CM

## 2022-03-28 DIAGNOSIS — Z11.4 SCREENING FOR HUMAN IMMUNODEFICIENCY VIRUS: ICD-10-CM

## 2022-03-28 DIAGNOSIS — E10.9 TYPE 1 DIABETES MELLITUS WITHOUT COMPLICATION (HCC): ICD-10-CM

## 2022-03-28 DIAGNOSIS — Z86.39 HISTORY OF IRON DEFICIENCY: ICD-10-CM

## 2022-03-28 DIAGNOSIS — R55 SYNCOPE AND COLLAPSE: Primary | ICD-10-CM

## 2022-03-28 LAB
LV EF: 57 %
LV EF: 58 %
LVEF MODALITY: NORMAL
LVEF MODALITY: NORMAL

## 2022-03-28 PROCEDURE — 93016 CV STRESS TEST SUPVJ ONLY: CPT | Performed by: INTERNAL MEDICINE

## 2022-03-28 PROCEDURE — 93017 CV STRESS TEST TRACING ONLY: CPT | Performed by: INTERNAL MEDICINE

## 2022-03-28 PROCEDURE — 99204 OFFICE O/P NEW MOD 45 MIN: CPT | Performed by: PHYSICIAN ASSISTANT

## 2022-03-28 PROCEDURE — 78452 HT MUSCLE IMAGE SPECT MULT: CPT | Performed by: INTERNAL MEDICINE

## 2022-03-28 PROCEDURE — A9500 TC99M SESTAMIBI: HCPCS | Performed by: INTERNAL MEDICINE

## 2022-03-28 PROCEDURE — 93018 CV STRESS TEST I&R ONLY: CPT | Performed by: INTERNAL MEDICINE

## 2022-03-28 PROCEDURE — 93306 TTE W/DOPPLER COMPLETE: CPT | Performed by: INTERNAL MEDICINE

## 2022-03-28 PROCEDURE — 93242 EXT ECG>48HR<7D RECORDING: CPT | Performed by: INTERNAL MEDICINE

## 2022-03-28 RX ORDER — FREMANEZUMAB-VFRM 225 MG/1.5ML
225 INJECTION SUBCUTANEOUS
Qty: 1 PEN | Refills: 2 | Status: SHIPPED | OUTPATIENT
Start: 2022-03-28 | End: 2022-03-29

## 2022-03-28 RX ORDER — RIZATRIPTAN BENZOATE 10 MG/1
10 TABLET ORAL PRN
Qty: 10 TABLET | Refills: 2 | Status: SHIPPED | OUTPATIENT
Start: 2022-03-28 | End: 2022-06-26

## 2022-03-28 ASSESSMENT — PATIENT HEALTH QUESTIONNAIRE - PHQ9
2. FEELING DOWN, DEPRESSED OR HOPELESS: 0
SUM OF ALL RESPONSES TO PHQ QUESTIONS 1-9: 0
SUM OF ALL RESPONSES TO PHQ9 QUESTIONS 1 & 2: 0
1. LITTLE INTEREST OR PLEASURE IN DOING THINGS: 0

## 2022-03-28 NOTE — PROGRESS NOTES
3/28/2022    Napoleon Harris    Chief Complaint   Patient presents with   Alexey Baer New Patient     establish care - Dr. Abbey Soler former PCP     Other     f/u from 03/4/22 visit with Dr. Rufino Berry and pw        HPI  History obtained from the patient. HPI dictated by PA student, Roe Soria, under my direct supervision. Maria Del Carmen Funes is a 50 y.o. female who presents today for establishment as a new patient The patient's last PCP was Dr. Abbey Soler, last visit was December of 2021. Medical history is significant for type 1 DM, HTN, Psoriatic arthritis, Achalasia, hypothyroidism, pernicious anemia, kidney stones. Surgical history significant for  runy gastric bypass (2014), appendectomy, cholecystectomy, hysterectomy, 2 laprascopic knee surgeries on right knee, multiple kidney stones, spinal cord stimulator, POEM. 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 Patient lives in [place], with daughter (22 and 32). Patient is a never smoker. Patient admits to occasional mixed drink or glass of wine but not often, once or twice a month. Patient admits sexual activity. Family history significant for mother had a stroke d/t undiagnosed a fib, DM, HTN, colon cancer. Patient's allergies include dilauted and metformin. Patient sees the following specialists: rheumatology, cardiology, endocrinology, urology, OB/GYN. The patient's pharmacy is pharmacy inside [facility] mail order pharmacy. Type 1 DM and Recent DKA - Patient was recently admitted 2/17/2022 through 2/21/2022 for DKA. Patient has been struggling with fatigue and trouble concentrating since this event, and she has been on short-term disability. She was excused 2/15/2022 through 5/9/2022 but still needs the paperwork to be completed. Patient follows with endocrinology, Dr. Pradeep Dubon. Fasting blood glucose has been averaging 90-120s.     Cardiology - Patient had stress test and echo done today, is being fitted for 7 day holter monitor to see check for paroxysmal a fib Hypertension - Well controlled on lisinopril, metoprolol    History of Iron Deficiency - Patient states that she has a history of low iron since her gastric bypass surgery in 2014. She notes that she has received 2 iron infusions since the surgery and has not had her iron levels checked in quite a while. Hypothyroidism - Compliant with Levothyroxine 150 mcg daily     Migraines - Patient is having about 6 migraines per month and uses Maxalt as abortive therapy as needed. The patient used to be on Topamax for migraine prevention, but unfortunately, after multiple kidney stones, her urologist instructed her to stop taking Topamax. Health Maintenance - Patient's care gaps include TSH testing, A1C. Patient's mother had colon cancer diagnosed at 64. The patient herself has already has a screening colonoscopy in 2019, and she was instructed to come back for repeat in 5 years. This was done at [address] January 18, 2019 by Dr. Regine Gee. REVIEW OF SYMPTOMS  Review of Systems   Constitutional: Negative for chills and fever. Respiratory: Negative for cough and shortness of breath. Gastrointestinal: Negative for diarrhea and vomiting.      PAST MEDICAL HISTORY  Past Medical History:   Diagnosis Date    Depression     Diabetes mellitus (HCC)     GERD (gastroesophageal reflux disease)     Hypertension     PONV (postoperative nausea and vomiting)     Thyroid disease        FAMILY HISTORY  Family History   Problem Relation Age of Onset    Hypertension Mother     High Cholesterol Mother     Arrhythmia Mother        SOCIAL HISTORY  Social History     Socioeconomic History    Marital status: Single     Spouse name: Not on file    Number of children: Not on file    Years of education: Not on file    Highest education level: Not on file   Occupational History    Not on file   Tobacco Use    Smoking status: Never Smoker    Smokeless tobacco: Never Used   Vaping Use    Vaping Use: Never used   Substance and Sexual Activity    Alcohol use: Not Currently     Comment: occ/caffeine 1 coffee a day    Drug use: Never    Sexual activity: Yes     Partners: Male   Other Topics Concern    Not on file   Social History Narrative    Not on file     Social Determinants of Health     Financial Resource Strain:     Difficulty of Paying Living Expenses: Not on file   Food Insecurity:     Worried About Running Out of Food in the Last Year: Not on file    Nena of Food in the Last Year: Not on file   Transportation Needs:     Lack of Transportation (Medical): Not on file    Lack of Transportation (Non-Medical):  Not on file   Physical Activity:     Days of Exercise per Week: Not on file    Minutes of Exercise per Session: Not on file   Stress:     Feeling of Stress : Not on file   Social Connections:     Frequency of Communication with Friends and Family: Not on file    Frequency of Social Gatherings with Friends and Family: Not on file    Attends Church Services: Not on file    Active Member of Clubs or Organizations: Not on file    Attends Club or Organization Meetings: Not on file    Marital Status: Not on file   Intimate Partner Violence:     Fear of Current or Ex-Partner: Not on file    Emotionally Abused: Not on file    Physically Abused: Not on file    Sexually Abused: Not on file   Housing Stability:     Unable to Pay for Housing in the Last Year: Not on file    Number of [place] in the Last Year: Not on file    Unstable Housing in the Last Year: Not on file        SURGICAL HISTORY  Past Surgical History:   Procedure Laterality Date   Guy Kee 83 Left 1/7/2022    Crow Leblanc 42 performed by Kajal Campbell MD at [facility] Left 12/28/2021    CYSTOSCOPY LEFT RETROGRADE PYLEOGRAMS Stent placement performed by Kristy Wilson MD at [address] [facility] SURGERY  2014    HYSTERECTOMY, TOTAL ABDOMINAL  2017    KIDNEY STONE SURGERY  2017    LITHOTRIPSY Left 1/7/2022    LEFT CYSTOSCOPY URETEROSCOPY RETROGRADE PYELOGRAM STONE MANIPULATION WITH HOLIUM LASER LITHOTRIPSY performed by Murali Graf MD at [facility]  2017   [address] [place] [address]       CURRENT MEDICATIONS  Current Outpatient Medications   Medication Sig Dispense Refill    secukinumab (COSENTYX) 150 MG/ML SOAJ Inject 150 mg into the skin once Once weekly for 5 weeks then once monthly      Fremanezumab-vfrm (AJOVY) 225 MG/1.5ML SOAJ Inject 225 mg into the skin every 30 days 1 pen 2    rizatriptan (MAXALT) 10 MG tablet Take 1 tablet by mouth as needed for Migraine 10 tablet 2    metoprolol succinate (TOPROL XL) 25 MG extended release tablet Take 1 tablet by mouth daily 90 tablet 3    lisinopril (PRINIVIL;ZESTRIL) 5 MG tablet Take 1 tablet by mouth daily 90 tablet 1    acetaminophen (TYLENOL) 325 MG tablet Take 650 mg by mouth every 4 hours as needed      Apple Cider Vinegar 500 MG TABS       B Complex-Biotin-FA (SUPER B-COMPLEX PO)       Biotin 1000 MCG TABS       Glucagon 1 MG/0.2ML SOSY Inject 1 Syringe into the skin as needed      insulin lispro (HUMALOG) 100 UNIT/ML injection vial USE UP TO 75 UNITS DAILY      Accu-Chek FastClix Lancets MISC       Multiple Vitamin (MULTIVITAMIN) capsule Take 1 capsule by mouth nightly      omeprazole (PRILOSEC) 40 MG delayed release capsule Take 1 capsule by mouth nightly 90 capsule 0    famotidine (PEPCID) 20 MG tablet Take 1 tablet by mouth daily 90 tablet 1    promethazine (PHENERGAN) 25 MG tablet Take 1 tablet by mouth every 6 hours as needed for Nausea 90 tablet 2    Cyanocobalamin (VITAMIN B12 PO) Take 1 tablet by mouth daily      CALCIUM PO Take 1 tablet by mouth daily      Cholecalciferol (VITAMIN D-3 PO) Take 1 tablet by mouth daily      Semaglutide, 1 MG/DOSE, 2 MG/1.5ML SOPN Inject 1 mg into the skin once a week Takes on Tuesdays      levothyroxine (SYNTHROID) 150 MCG tablet Take 150 mcg by mouth Daily      pregabalin (LYRICA) 150 MG capsule Take 300 mg by mouth 2 times daily.  simvastatin (ZOCOR) 20 MG tablet Take 20 mg by mouth nightly      aspirin 81 MG EC tablet Take 81 mg by mouth daily      Insulin Pump - Insulin regular Inject into the skin continuous Insulin-to-Carb Ratio (ICR): Insulin Sensitivity Factor (ISF):   Target Blood Glucose: Bolus Frequency:   Humalog    Insulin pump with CGM      sertraline (ZOLOFT) 100 MG tablet Take 150 mg by mouth daily       No current facility-administered medications for this visit. ALLERGIES  Allergies   Allergen Reactions    Dilaudid [Hydromorphone Hcl] Rash    Metformin And Related Nausea And Vomiting       RECENT LABS    Lab Results   Component Value Date    LABA1C 7.7 11/08/2021     No results found for: EAG    Lab Results   Component Value Date    CHOL 155 06/24/2021     Lab Results   Component Value Date    LDLCALC 70 06/24/2021       Lab Results   Component Value Date    WBC 5.1 03/04/2022    HGB 12.1 03/04/2022    HCT 37.0 03/04/2022    MCV 94.9 03/04/2022     03/04/2022       PHYSICAL EXAM  /84   Pulse 80   Ht 5' 3\" (1.6 m)   Wt 200 lb 1.6 oz (90.8 kg)   SpO2 95%   BMI 35.45 kg/m²     Physical Exam  Constitutional:       Appearance: Normal appearance. She is obese. HENT:      Head: Normocephalic and atraumatic. Eyes:      Comments: EOM grossly intact. Cardiovascular:      Rate and Rhythm: Normal rate and regular rhythm. Heart sounds: No murmur heard. No friction rub. No gallop. Pulmonary:      Effort: Pulmonary effort is normal.      Breath sounds: Normal breath sounds. No wheezing, rhonchi or rales. Skin:     General: Skin is warm and dry. Neurological:      Mental Status: She is alert and oriented to person, place, and time.       Comments: Cranial nerves II-XII grossly intact   Psychiatric: Mood and Affect: Mood normal.         Behavior: Behavior normal.         ASSESSMENT & PLAN  1. Encounter to establish care    2. Migraine without status migrainosus, not intractable, unspecified migraine type  Patient is having about 6 migraines per month. She is already tried Topamax and amitriptyline and was not able to tolerate either. Discussed treatment options and decided to try Ajovy. Sent prescription and will follow up in 4 weeks. Continue using Maxalt as needed for abortive therapy. - Fremanezumab-vfrm (AJOVY) 225 MG/1.5ML SOAJ; Inject 225 mg into the skin every 30 days  Dispense: 1 pen; Refill: 2  - rizatriptan (MAXALT) 10 MG tablet; Take 1 tablet by mouth as needed for Migraine  Dispense: 10 tablet; Refill: 2    3. Type 1 diabetes mellitus without complication [facility])  Patient follows with endocrinology, Dr. Lakshmi Fontaine. - Hemoglobin A1C; Future  - Comprehensive Metabolic Panel; Future    4. Status post gastric bypass for obesity  We will check blood work and advise based on findings. - Iron and TIBC; Future  - Ferritin; Future  - Vitamin B12; Future    5. Primary hypertension  Well-controlled. 6. Hypothyroidism, unspecified type  We will check blood work. - TSH; Future  - T4, Free; Future    7. Psoriatic arthritis [facility])  Patient follows with rheumatology. 8. History of iron deficiency  We will check fasting blood work and advise based on findings. - Iron and TIBC; Future  - Ferritin; Future    9. History of kidney stones  Patient follows with urology. 10. Achalasia    11. Need for hepatitis C screening test  Health maintenance requirement. Patient is agreeable to screening.   - Hepatitis C Antibody; Future    12. Screening for human immunodeficiency virus  Health maintenance requirement. Patient is agreeable to screening.   - HIV Screen; Future          Return in about 4 weeks (around 4/25/2022).             Electronically signed by Shaquille López PA-C on 3/28/2022

## 2022-03-29 ENCOUNTER — TELEPHONE (OUTPATIENT)
Dept: FAMILY MEDICINE CLINIC | Age: 47
End: 2022-03-29

## 2022-03-29 ENCOUNTER — CARE COORDINATION (OUTPATIENT)
Dept: OTHER | Facility: CLINIC | Age: 47
End: 2022-03-29

## 2022-03-29 DIAGNOSIS — G43.909 MIGRAINE WITHOUT STATUS MIGRAINOSUS, NOT INTRACTABLE, UNSPECIFIED MIGRAINE TYPE: Primary | ICD-10-CM

## 2022-03-29 RX ORDER — ERENUMAB-AOOE 70 MG/ML
70 INJECTION SUBCUTANEOUS
Qty: 1 PEN | Refills: 5 | Status: SHIPPED | OUTPATIENT
Start: 2022-03-29 | End: 2022-05-23 | Stop reason: SDUPTHER

## 2022-03-29 NOTE — TELEPHONE ENCOUNTER
----- Message from Julia Hugo PA-C sent at 3/28/2022  6:11 PM EDT -----  Regarding: Colonoscopy Record  Patient had a colonoscopy done at [facility] on January 18, 2019 by Dr. Corrina Nicholson.   Please fax a record request.

## 2022-03-29 NOTE — CARE COORDINATION
Nyla 45 Transitions Follow Up Call    3/30/2022    Patient: Estela Mcadams  Patient : 1975   MRN: [id number]  Reason for Admission:FRANK, DKA    Discharge Date: 2022  RARS: Readmission Risk Score: 11 ( )         Spoke with: Estela Mcadams, patient      Care Transitions Subsequent and Final Call    Subsequent and Final Calls  Do you have any ongoing symptoms?: Yes  Onset of Patient-reported symptoms: Other  Patient-reported symptoms: Other  Have your medications changed?: No  Do you have any questions related to your medications?: No  Do you currently have any active services?: No  Do you have any needs or concerns that I can assist you with?: No  Identified Barriers: None  Care Transitions Interventions  Other Interventions:         Care Transitions Follow Up Call    Needs to be reviewed by the provider   Additional needs identified to be addressed with provider: No  none             Method of communication with provider : none      Care Transition Nurse (CTN) contacted the patient by telephone to follow up after admission on 2022. Verified name and  with patient as identifiers. Patient returned call to [facility] this date and [facility] she has followed up with cardiology, endocrinology, PCP, & rheumatology since recent hospitalization. [facility] had cardiac testing this week and now has 7 day Holter monitor in place. [facility] has follow up with cardiology next week to discuss results of recent testing. Patient states PCP started Aimovig for migraine maintenance this week. States prior Elida Moise was completed on med but waiting pharmacy to call stating RX ready for . Patient states had been taking Topamax for migraines but had to stop due to kidney stones. States when on Topamax, had ~1-2 migraines/month. Patient now reports recently with ~6-7+ migraines/month since off Topamax. Patient states rheumatology started Cosentyx- which she is waiting delivery from [facility].  States hoping to start med on Tuesday next week.  was previously on Remicade which rheumatology discontinued due to adverse reaction. Patient states rheumatology wants patient to remain off work during loading dose of Cosentyx due to possible joint inflammation- which would be worsened with working 12+ hour shifts. Patient states plan is to return to work end of April if possible. [facility] leave of absence approved until 5/9/2022. Patient states she continues to stay with daughter and that the lease on her own place recently termed. States she had been thinking of moving prior to recent health issues so she is now moved in with daughter until further notice. States daughters and herself are reluctant of patient returning home alone due to DKA came on so quickly this last time. Patient  has not yet received any payment from [employer] for short term disability. States she has been in contact with absence manager. States [facility] had recently requested additional documentation from PCP- which patient  was faxed yesterday. States if she has not heard update from [employer] by tomorrow- she plans to reach out to them to check status. Patient reports continued issues with endurance and states was worn out this week after at appointments for several hours. States she is seeing that she is not yet ready to return to work. Patient denies ongoing needs or concerns for ACM. Denies need for further outreach from [employer] [facility]. ACM signing off but encouraged patient to reach out if concerns arise. Patient verbalized understanding. Addressed changes since last contact: follow up appointment-Dr. Ogden Living 3/14/22  Discussed follow-up appointments. If no appointment was previously scheduled, appointment scheduling offered: No.   Is follow up appointment scheduled within 7 days of discharge? No.    Advance Care Planning:   Does patient have an Advance Directive: decision maker previously updated with patient.  .     CTN reviewed discharge instructions, medical action plan and red flags with patient and discussed any barriers to care and/or understanding of plan of care after discharge. Discussed appropriate site of care based on symptoms and resources available to patient including: PCP  Specialist  Benefits related nurse triage [phone]. The patient agrees to contact the PCP office for questions related to their healthcare. Patients top risk factors for readmission: functional physical ability  medical condition-recently hospitalized for DKA with rhabdo  multiple health system providers  polypharmacy  stages of grief  caregiver stress  Interventions to address risk factors: Reinforced importance of regular follow up appts    CTN provided contact information for future needs. No further follow-up call indicated based on severity of symptoms and risk factors. Follow Up  Future Appointments   Date Time Provider Yousuf Godinez   4/6/2022  2:00 PM Magalys Guerrero MD [facility]   4/25/2022  1:00 PM Diamante Santana PA-C [facility] FPS ROSA Yancey RN  Associate Care Manager  Phone: [phone]  Email: [email or web address]

## 2022-03-31 ENCOUNTER — TELEPHONE (OUTPATIENT)
Dept: CARDIOLOGY CLINIC | Age: 47
End: 2022-03-31

## 2022-03-31 NOTE — TELEPHONE ENCOUNTER
03/28/2022  ECHO    Summary   Left ventricular function and size is normal, EF is estimated at 55-60%. Mild left ventricular hypertrophy. Grade I diastolic dysfunction. No regional wall motion abnormalities were detected. No significant valvular abnormalities. Mild tricuspid regurgitation; RVSP is 33 mmHg. No evidence of any pericardial effusion.     03/28/2022  NM    Summary    Supervising physician Dr. Valencia Bauer .        Normal tracer uptake in all segments of myocardium on stress and rest    images.    No infarct or ischemia noted.    Normal EF 57 % with normal ventricular contractility.        Recommendation    Medical management         PT NOTIFIED OF RESULTS AND ADVISED TO KEEP APPT

## 2022-04-05 ENCOUNTER — TELEPHONE (OUTPATIENT)
Dept: FAMILY MEDICINE CLINIC | Age: 47
End: 2022-04-05

## 2022-04-05 NOTE — TELEPHONE ENCOUNTER
PRINTED OUT FMLA THAT DR. HERNANDEZ COMPLETED AND CLIPPED IT TO NEW FORMS NEEDING YOU TO FILL OUT.   ON YOUR LEDGE

## 2022-04-05 NOTE — TELEPHONE ENCOUNTER
Dr. Ivette Keller did see patient for hospital follow up. She then established with [facility] after that. In media you will see first form Dr. Ivette Keller filled out. Placed most recent form request on your desk.

## 2022-04-06 ENCOUNTER — OFFICE VISIT (OUTPATIENT)
Dept: CARDIOLOGY CLINIC | Age: 47
End: 2022-04-06
Payer: COMMERCIAL

## 2022-04-06 ENCOUNTER — HOSPITAL ENCOUNTER (OUTPATIENT)
Age: 47
Discharge: HOME | End: 2022-04-06
Payer: COMMERCIAL

## 2022-04-06 VITALS
DIASTOLIC BLOOD PRESSURE: 78 MMHG | HEIGHT: 63 IN | HEART RATE: 76 BPM | WEIGHT: 207 LBS | SYSTOLIC BLOOD PRESSURE: 122 MMHG | BODY MASS INDEX: 36.68 KG/M2

## 2022-04-06 DIAGNOSIS — I10 ESSENTIAL HYPERTENSION: ICD-10-CM

## 2022-04-06 DIAGNOSIS — Z86.39 HISTORY OF IRON DEFICIENCY: ICD-10-CM

## 2022-04-06 DIAGNOSIS — R55 SYNCOPE AND COLLAPSE: Primary | ICD-10-CM

## 2022-04-06 DIAGNOSIS — Z11.59 NEED FOR HEPATITIS C SCREENING TEST: ICD-10-CM

## 2022-04-06 DIAGNOSIS — E03.9 HYPOTHYROIDISM, UNSPECIFIED TYPE: ICD-10-CM

## 2022-04-06 DIAGNOSIS — E66.09 CLASS 2 OBESITY DUE TO EXCESS CALORIES WITHOUT SERIOUS COMORBIDITY WITH BODY MASS INDEX (BMI) OF 36.0 TO 36.9 IN ADULT: ICD-10-CM

## 2022-04-06 DIAGNOSIS — E10.9 TYPE 1 DIABETES MELLITUS WITHOUT COMPLICATION (HCC): ICD-10-CM

## 2022-04-06 DIAGNOSIS — Z11.4 SCREENING FOR HUMAN IMMUNODEFICIENCY VIRUS: ICD-10-CM

## 2022-04-06 DIAGNOSIS — R00.2 PALPITATIONS: ICD-10-CM

## 2022-04-06 DIAGNOSIS — E78.5 DYSLIPIDEMIA: ICD-10-CM

## 2022-04-06 DIAGNOSIS — Z98.84 STATUS POST GASTRIC BYPASS FOR OBESITY: ICD-10-CM

## 2022-04-06 LAB
ALBUMIN SERPL-MCNC: 3.9 GM/DL (ref 3.4–5)
ALP BLD-CCNC: 66 IU/L (ref 40–129)
ALT SERPL-CCNC: 17 U/L (ref 10–40)
ANION GAP SERPL CALCULATED.3IONS-SCNC: 8 MMOL/L (ref 4–16)
AST SERPL-CCNC: 20 IU/L (ref 15–37)
BILIRUB SERPL-MCNC: 0.3 MG/DL (ref 0–1)
BUN BLDV-MCNC: 11 MG/DL (ref 6–23)
CALCIUM SERPL-MCNC: 9.1 MG/DL (ref 8.3–10.6)
CHLORIDE BLD-SCNC: 107 MMOL/L (ref 99–110)
CO2: 27 MMOL/L (ref 21–32)
CREAT SERPL-MCNC: 0.5 MG/DL (ref 0.6–1.1)
ESTIMATED AVERAGE GLUCOSE: 177 MG/DL
FERRITIN: 108 NG/ML (ref 15–150)
GFR AFRICAN AMERICAN: >60 ML/MIN/1.73M2
GFR NON-AFRICAN AMERICAN: >60 ML/MIN/1.73M2
GLUCOSE BLD-MCNC: 91 MG/DL (ref 70–99)
HBA1C MFR BLD: 7.8 % (ref 4.2–6.3)
HEPATITIS C ANTIBODY: NON REACTIVE
IRON: 94 UG/DL (ref 37–145)
PCT TRANSFERRIN: 40 % (ref 10–44)
POTASSIUM SERPL-SCNC: 4.4 MMOL/L (ref 3.5–5.1)
SODIUM BLD-SCNC: 142 MMOL/L (ref 135–145)
T4 FREE: 1.41 NG/DL (ref 0.9–1.8)
TOTAL IRON BINDING CAPACITY: 236 UG/DL (ref 250–450)
TOTAL PROTEIN: 6 GM/DL (ref 6.4–8.2)
TSH HIGH SENSITIVITY: 0.35 UIU/ML (ref 0.27–4.2)
UNSATURATED IRON BINDING CAPACITY: 142 UG/DL (ref 110–370)

## 2022-04-06 PROCEDURE — 83970 ASSAY OF PARATHORMONE: CPT

## 2022-04-06 PROCEDURE — 83036 HEMOGLOBIN GLYCOSYLATED A1C: CPT

## 2022-04-06 PROCEDURE — 82607 VITAMIN B-12: CPT

## 2022-04-06 PROCEDURE — 86480 TB TEST CELL IMMUN MEASURE: CPT

## 2022-04-06 PROCEDURE — 83550 IRON BINDING TEST: CPT

## 2022-04-06 PROCEDURE — 87389 HIV-1 AG W/HIV-1&-2 AB AG IA: CPT

## 2022-04-06 PROCEDURE — 86803 HEPATITIS C AB TEST: CPT

## 2022-04-06 PROCEDURE — 82728 ASSAY OF FERRITIN: CPT

## 2022-04-06 PROCEDURE — 84439 ASSAY OF FREE THYROXINE: CPT

## 2022-04-06 PROCEDURE — 82310 ASSAY OF CALCIUM: CPT

## 2022-04-06 PROCEDURE — 83540 ASSAY OF IRON: CPT

## 2022-04-06 PROCEDURE — 80053 COMPREHEN METABOLIC PANEL: CPT

## 2022-04-06 PROCEDURE — 99214 OFFICE O/P EST MOD 30 MIN: CPT | Performed by: INTERNAL MEDICINE

## 2022-04-06 PROCEDURE — 36415 COLL VENOUS BLD VENIPUNCTURE: CPT

## 2022-04-06 PROCEDURE — 84443 ASSAY THYROID STIM HORMONE: CPT

## 2022-04-06 RX ORDER — LISINOPRIL 5 MG/1
5 TABLET ORAL DAILY
Qty: 90 TABLET | Refills: 3 | Status: SHIPPED | OUTPATIENT
Start: 2022-04-06 | End: 2023-04-01

## 2022-04-06 RX ORDER — METOPROLOL SUCCINATE 25 MG/1
25 TABLET, EXTENDED RELEASE ORAL DAILY
Qty: 90 TABLET | Refills: 3 | Status: SHIPPED | OUTPATIENT
Start: 2022-04-06 | End: 2023-04-01

## 2022-04-06 NOTE — PROGRESS NOTES
Maria Elena Reis MD                                  CARDIOLOGY  NOTE      Chief Complaint:    Chief Complaint   Patient presents with    Results     Pt denies any new cardiac sx, Palpitations and Dizziness is getting better        Echocardiogram 3/28/2022      Left ventricular function and size is normal, EF is estimated at 55-60%. Mild left ventricular hypertrophy. Grade I diastolic dysfunction. No regional wall motion abnormalities were detected. No significant valvular abnormalities. Mild tricuspid regurgitation; RVSP is 33 mmHg. No evidence of any pericardial effusion. Stress MPI 3/28/2022     Normal tracer uptake in all segments of myocardium on stress and rest    images.    No infarct or ischemia noted.    Normal EF 57 % with normal ventricular contractility.        Recommendation    Medical management         Prior HPI:     Jovani Mcdermott is a 50y.o. year old female, who is a registered nurse at [employer], presents to the clinic with chief complaint of dyspnea, palpitations, syncope and fatigue. Patient has been diagnosed previously with palpitations at [facility]. Few weeks back patient underwent Holter monitor which revealed tachycardia, and patient was started on beta-blockers. Patient also has prior medical history significant for hypertension hyperlipidemia diabetes mellitus insulin-dependent     In February, patient had an episode of diabetic ketoacidosis severe rhabdomyolysis. She was unresponsive on the floor at home by herself and was escorted to the nearest hospital/[place] for management of diabetic ketoacidosis. Patient mention her sugar level was over 1000 and probably her insulin pump was not functioning. Patient was also noted to have elevated troponin during the hospitalization, which was positive delta change. Patient denies any prior established history of CAD CHF or arrhythmia other than tachycardia 2 years ago.     She is a non-smoker, denies any alcohol or drug abuse. She is drinks socially alcohol/wine once in a while. Family history significant for atrial fibrillation in her mother which was undiagnosed fairly late. Patient mother had 2 strokes second one fatal in her 62s. Patient denies any resting chest pain or palpitations  Complains of occasional dyspnea    EKG in the office today shows sinus rhythm at 90 bpm no ischemic changes noted otherwise.              Current Outpatient Medications   Medication Sig Dispense Refill    lisinopril (PRINIVIL;ZESTRIL) 5 MG tablet Take 1 tablet by mouth daily 90 tablet 3    metoprolol succinate (TOPROL XL) 25 MG extended release tablet Take 1 tablet by mouth daily 90 tablet 3    Erenumab-aooe (AIMOVIG) 70 MG/ML SOAJ Inject 70 mg into the skin every 30 days 1 pen 5    secukinumab (COSENTYX) 150 MG/ML SOAJ Inject 150 mg into the skin once Once weekly for 5 weeks then once monthly      rizatriptan (MAXALT) 10 MG tablet Take 1 tablet by mouth as needed for Migraine 10 tablet 2    acetaminophen (TYLENOL) 325 MG tablet Take 650 mg by mouth every 4 hours as needed      Apple Cider Vinegar 500 MG TABS       B Complex-Biotin-FA (SUPER B-COMPLEX PO)       Biotin 1000 MCG TABS       Glucagon 1 MG/0.2ML SOSY Inject 1 Syringe into the skin as needed      insulin lispro (HUMALOG) 100 UNIT/ML injection vial USE UP TO 75 UNITS DAILY      Accu-Chek FastClix Lancets MISC       Multiple Vitamin (MULTIVITAMIN) capsule Take 1 capsule by mouth nightly      omeprazole (PRILOSEC) 40 MG delayed release capsule Take 1 capsule by mouth nightly 90 capsule 0    famotidine (PEPCID) 20 MG tablet Take 1 tablet by mouth daily 90 tablet 1    Cyanocobalamin (VITAMIN B12 PO) Take 1 tablet by mouth daily      CALCIUM PO Take 1 tablet by mouth daily      Cholecalciferol (VITAMIN D-3 PO) Take 1 tablet by mouth daily      Semaglutide, 1 MG/DOSE, 2 MG/1.5ML SOPN Inject 1 mg into the skin once a week Takes on Tuesdays      levothyroxine (SYNTHROID) 150 MCG tablet Take 150 mcg by mouth Daily      pregabalin (LYRICA) 150 MG capsule Take 300 mg by mouth 2 times daily.  simvastatin (ZOCOR) 20 MG tablet Take 20 mg by mouth nightly      aspirin 81 MG EC tablet Take 81 mg by mouth daily      Insulin Pump - Insulin regular Inject into the skin continuous Insulin-to-Carb Ratio (ICR): Insulin Sensitivity Factor (ISF):   Target Blood Glucose: Bolus Frequency:   Humalog    Insulin pump with CGM      sertraline (ZOLOFT) 100 MG tablet Take 150 mg by mouth daily       No current facility-administered medications for this visit. Allergies:     Dilaudid [hydromorphone hcl] and Metformin and related    Patient History:    Past Medical History:   Diagnosis Date    Depression     Diabetes mellitus ([facility] [address]     GERD (gastroesophageal reflux disease)     History of stress test 03/28/2022    Normal EF 57 % Normal tracer uptake in all segments of myocardium on stress and rest images    Hx of echocardiogram 03/28/2022    EF55-60%. Mild left ventricular hypertrophy.  Mild tricuspid regurgitation;    Hypertension     PONV (postoperative nausea and vomiting)     Thyroid disease      Past Surgical History:   Procedure Laterality Date    APPENDECTOMY  1989    BLADDER SURGERY Left 1/7/2022    CYSTOSCOPY RETROGRADE PYELOGRAM URETERAL STENT REMOVAL performed by Alcon Eastman MD at [facility] Left 12/28/2021    CYSTOSCOPY LEFT RETROGRADE PYLEOGRAMS Stent placement performed by Milo Weems MD at [address]  2014    HYSTERECTOMY, TOTAL ABDOMINAL  2017    KIDNEY STONE SURGERY  2017    LITHOTRIPSY Left 1/7/2022    LEFT CYSTOSCOPY URETEROSCOPY RETROGRADE PYELOGRAM STONE MANIPULATION WITH HOLIUM LASER LITHOTRIPSY performed by Alcon Eastman MD at [address] [place] [address]  2017    TONSILLECTOMY  1997     Family History   Problem Relation Age of Onset    S1-S2, no added heart sounds, No Mumurs appreciated. No gallops, rubs. No Pedal Edema   GI:  Bowel sounds normal, Soft, No tenderness,  :  No costovertebral angle tenderness   Musculoskeletal:  No gross deformities. Back- No tenderness  Integument:  Well hydrated, no rash   Lymphatic:  No lymphadenopathy noted   Neurologic:  Alert & oriented x 3, Normal motor function, normal sensory function, no focal deficits noted   Psychiatric:  Speech and behavior appropriate       Medical decision making and Data review:    DATA:    No results found for: TROPONINT  BNP:  No results found for: PROBNP  PT/INR:  No results found for: PTINR  Lab Results   Component Value Date    LABA1C 7.7 11/08/2021     Lab Results   Component Value Date    CHOL 155 06/24/2021    TRIG 92 06/24/2021    HDL 67 06/24/2021    LDLCALC 70 06/24/2021     Lab Results   Component Value Date    WBC 5.1 03/04/2022    HGB 12.1 03/04/2022    HCT 37.0 03/04/2022    MCV 94.9 03/04/2022     03/04/2022     TSH: No results found for: TSH  Lab Results   Component Value Date    AST 46 (H) 03/04/2022    ALT 41 (H) 03/04/2022    BILITOT 0.4 03/04/2022    ALKPHOS 92 03/04/2022         All labs, medications and tests reviewed by myself including data and history from outside source , patient and available family . 1. Syncope and collapse    2. Palpitations    3. Essential hypertension    4. Dyslipidemia    5. Class 2 obesity due to excess calories without serious comorbidity with body mass index (BMI) of 36.0 to 36.9 in adult         Impression and Plan:      1. Syncope:    Likely secondary to diabetic ketoacidosis with severe rhabdomyolysis requiring ICU admission  Echocardiogram reviewed preserved LV ejection fraction normal findings. 2. Elevated troponin during hospitalization:  Likely secondary to rhabdomyolysis/type II MI. Stress MPI is negative for ischemia. Patient was reassured.       3. History of palpitations, family history of atrial fibrillation      Obtain 7-day event monitor on beta-blocker : Results are pending at this point  Continue metoprolol XL 25 mg      4. Essential hypertension:  Continue metoprolol XL 25 mg p.o. daily  Lisinopril 5 mg daily, reduced from 20 mg last visit    5. Insulin-dependent diabetes mellitus: Follow-up with endocrinology closely as patient had at least 2 episodes of DKA with syncope in the last 10 years    6. Hyperlipidemia: Continue with simvastatin 20 mg daily    7. Obesity with BMI of 36.67: Low-salt low-fat diet and exercise as tolerated         Return in about 9 months (around 1/6/2023). Counseled extensively and medication compliance urged. We discussed that for the  prevention of ASCVD our  goal is aggressive risk modification. Patient is encouraged to exercise even a brisk walk for 30 minutes  at least 3 to 4 times a week   Various goals were discussed and questions answered. Continue current medications. Appropriate prescriptions are addressed and refills ordered. Questions answered and patient verbalizes understanding. Call for any problems, questions, or concerns.

## 2022-04-07 ENCOUNTER — TELEPHONE (OUTPATIENT)
Dept: INTERNAL MEDICINE | Age: 47
End: 2022-04-07

## 2022-04-07 LAB — HIV SCREEN: NON REACTIVE

## 2022-04-07 NOTE — TELEPHONE ENCOUNTER
Specialty Medication Service    Patient's Name: Og Gallo YOB: 1975      Og Gallo is a 50 y.o. female presenting today for Specialty Medication Service visit. Patient is prescribed [facility] formulary medication, Cosentyx and Aimovig. Medication list updated. Specialty Medication: COSENTYX 300 MG DOSE-2 PENS   Frequency: Every 28 days  Indication: Psoriatic Arthritis  Initially Diagnosed: unknown  Additional Therapy:   · none  Previous Therapy:   · Methotrexate    Specialist:   [address]   [phone]    Specialist Progress Note Available: Yes, scanned in Media tab  Last Specialist Visit: 5/22/2022 - still having general pain and stiffness (likely will change insurance soon) and Cosentyx is making a difference; RAPID-3: 9.2 (disease activity); continue Cosentyx. 3/22/2022 - joint pain and stiffness worsened over last couple months; RAPID-3: 12.3 (disease activity); starting Cosentyx    Specialty Medication: AIMOVIG 70 MG/ML AUTOINJECTOR   Frequency: Every 30 days  Indication: Migraines  Initially Diagnosed: unknown  Additional Therapy:   · Maxalt  Previous Therapy:   · Topiramate  · Metoprolol    Specialist:   Gris Templeton  [address] [place] [address], [address] [phone]    Specialist Progress Note Available: Yes  Last Specialist Visit: 3/28/2022 - Establish new patient; 6 migraines per month, to start Ajovy; [facility] reached out to switch to Audria Pack in place of Ajovy    Allergies   Allergen Reactions    Dilaudid [Hydromorphone Hcl] Rash    Metformin And Related Nausea And Vomiting        Past Medical History:   Diagnosis Date    Depression     Diabetes mellitus ([facility] [address]     GERD (gastroesophageal reflux disease)     History of stress test 03/28/2022    Normal EF 57 % Normal tracer uptake in all segments of myocardium on stress and rest images    Hx of echocardiogram 03/28/2022    EF55-60%. Mild left ventricular hypertrophy. Mild tricuspid regurgitation;    Hypertension     PONV (postoperative nausea and vomiting)     Thyroid disease       Social History     Tobacco Use    Smoking status: Never Smoker    Smokeless tobacco: Never Used   Substance Use Topics    Alcohol use: Not Currently     Comment: occ/caffeine 1 coffee a day     Family History   Problem Relation Age of Onset    Hypertension Mother     High Cholesterol Mother     Arrhythmia Mother      REVIEW OF CURRENT DISEASE STATE  Psoriatic Arthritis: Patient with diagnosis of psoriatic arthritis. Current symptoms include joint pain and joint swelling and are of mild severity. Symptoms are made worse by: overuse and raising arm over head. Symptoms are helped by heat. Associated symptoms include joint pain. Overall disease activity:  improved. Limitation on activities include none. Migraine Headaches: She has a well established history of recurrent migraines. The migraines usually occur occasional and typically last several hours. The headaches are brought on by nothing that he/she knows of, and are relieved by Rx Meds - triptan therapy. Precipitating factors include patient is aware of none. Current number of migraines in past month: 3-4 Her migraines have been getting better lately. Migraine treatment typically includes triptan therapy. Usual duration of migraine: 4-72 hours Usual recovery time: 12 hours      Lifestyle factors associated with migraines:   More than 2 drinks per day? No  Dehydration? No  High caffeine intake? No  High stress level? No  Irregular sleep patterns? No  Skipping meals? No  Too much screen time?  No    · Considering all the ways in which this illness and health conditions may affect you at this time, how are you doing (0 = very well, 10 = very poorly): 5  · How would you rate your pain on average? (0 = no pain, 10 = worst pain imaginable) 3  · During the past 4 weeks, have you missed any planned activities of daily living due to condition (work/school/other plans)? No  · During the past 4 weeks, have you had to seek urgent care, ER admission, Unplanned   doctor office visit, or hospital admission? No    MEDICATIONS  Current Outpatient Medications   Medication Sig Dispense Refill    pregabalin (LYRICA) 300 MG capsule Take 300 mg by mouth in the morning and at bedtime.  simvastatin (ZOCOR) 20 MG tablet Take 1 tablet by mouth nightly 90 tablet 1    levothyroxine (SYNTHROID) 150 MCG tablet Take 1 tablet by mouth Daily 90 tablet 1    omeprazole (PRILOSEC) 40 MG delayed release capsule Take 1 capsule by mouth nightly 90 capsule 1    sertraline (ZOLOFT) 100 MG tablet Take 1.5 tablets by mouth daily 135 tablet 1    ketoconazole (NIZORAL) 2 % cream Apply topically once daily to cover the affected and immediate surrounding area for 2 weeks. 30 g 0    fluconazole (DIFLUCAN) 150 MG tablet Take 1 tablet my mouth. If symptoms do not resolve, take a second tablet 72 hours later. 2 tablet 1    nystatin (MYCOSTATIN) 271938 UNIT/GM powder Apply 3 times daily.  30 g 1    lisinopril (PRINIVIL;ZESTRIL) 5 MG tablet Take 1 tablet by mouth daily 90 tablet 3    metoprolol succinate (TOPROL XL) 25 MG extended release tablet Take 1 tablet by mouth daily 90 tablet 3    Erenumab-aooe (AIMOVIG) 70 MG/ML SOAJ Inject 70 mg into the skin every 30 days 1 pen 5    secukinumab (COSENTYX) 150 MG/ML SOAJ Inject 150 mg into the skin once Once weekly for 5 weeks then once monthly      rizatriptan (MAXALT) 10 MG tablet Take 1 tablet by mouth as needed for Migraine 10 tablet 2    acetaminophen (TYLENOL) 325 MG tablet Take 650 mg by mouth every 4 hours as needed      Apple Cider Vinegar 500 MG TABS Take 500 mg by mouth daily       B Complex-Biotin-FA (SUPER B-COMPLEX PO) Take 1 tablet by mouth daily       Biotin 1000 MCG TABS Take 1,000 mcg by mouth daily       Glucagon 1 MG/0.2ML SOSY Inject 1 Syringe into the skin as needed      insulin lispro (HUMALOG) 100 UNIT/ML injection vial USE UP TO 75 UNITS DAILY      Multiple Vitamin (MULTIVITAMIN) capsule Take 1 capsule by mouth nightly      famotidine (PEPCID) 20 MG tablet Take 1 tablet by mouth daily 90 tablet 1    Cyanocobalamin (VITAMIN B12 PO) Take 1 tablet by mouth daily      CALCIUM PO Take 1 tablet by mouth daily      Cholecalciferol (VITAMIN D-3 PO) Take 1 tablet by mouth daily      Semaglutide, 1 MG/DOSE, 2 MG/1.5ML SOPN Inject 1 mg into the skin once a week Takes on Tuesdays      aspirin 81 MG EC tablet Take 81 mg by mouth daily      Accu-Chek FastClix Lancets MISC       Insulin Pump - Insulin regular Inject into the skin continuous Insulin-to-Carb Ratio (ICR): Insulin Sensitivity Factor (ISF):   Target Blood Glucose: Bolus Frequency:   Humalog    Insulin pump with CGM       No current facility-administered medications for this visit. Current Specialty Medication:   Secukinumab (Cosentyx)   Administer COSENTYX subcutaneously with or without a loading dosage. With a loading dose, it is recommended that adult patients with psoriatic arthritis be dosed at 150 mg at weeks 0, 1, 2, 3, and 4 and every 4 weeks thereafter. Without a loading dose, the recommended dose is 150 mg every 4 weeks. If a patient continues to have active psoriatic arthritis, consider a dosage of 300 mg which may be administered with or without methotrexate. For psoriatic arthritis patients with coexistent moderate to severe plaque psoriasis, the dosing and administration recommendations for plaque psoriasis should be used: A 300 mg subcutaneous injection at Weeks 0, 1, 2, 3, and 4 followed by 300 mg every 4 weeks.     Recommended Monitoring:   Monitor for hypersensitivity reactions, infections (ie nasopharyngitis, upper respiratory tract infection, and mucocutaneous candida infection) and new onset or exacerbation of inflammatory bowel of inflammatory bowel disease  CBC (at 0 & 90 days then every 6 months) High risk patients, such as those with a history of Inflammatory Bowel Disease or TB should be monitored more frequently  Storage: Pens, prefilled syringes and vials must be refrigerated at [facility] to [facility] (36ºF to 46ºF). Keep in original carton to protect from light until the time of use. Do not freeze. Prior to administration:  Remove pens or prefilled syringes from refrigerator and allow to sit for 15-30 minutes at room temperature. If using reconstituted vials, use immediately after reconstitution or store in refrigerator for up to 24 hours. (remove from refrigerator and allow to stand for 15-30 minutes prior to use. Must use within 1 hour of removal from refrigerator. Patient Reported Side Effects: none    Specialty Medication Start Date: 4/4/2022  Appropriate Dose: Yes  Last tuberculin Test: 4/6/2022   - Result: awaiting results    Current Specialty Medication:   Erenumab (Aimovig)  ADR Monitoring: Hypersensitivity reactions and Constipation including cases with serious complications resulting in hospitalization and surgery, has been reported. Constipation has generally occurred after the first dose; however, a later onset has also been observed. Concurrent use of medications that decrease GI motility may increase the risk for more severe constipation and the potential for constipation-related complications. Lab Monitoring: None recommended at this time. Instructed patient to keep tract of number of monthly migraine days to access efficacy  Storage: Refrigerate pre-filled syringe at 2°C to 8°C (36°F to 46°F). Do not freeze. Do not shake. Handling: Keep in original carton to protect from light until time of use. If removed from fridge, keep at room temperature in the original carton. (Must be used within 7 days). Do not use beyond date on carton if refrigerated or beyond 7 days if out of refrigerator.    Prior to SQ administration, remove from refrigerator and allow to sit at room temperature, outside of carton for at least 30 minutes (protect from direct sunlight) - do not warm by using other heat source (e.x hot water or microwave)  Patient reported side effects: none    Specialty Medication Start Date: 4/4/2022  Appropriate Dose: Yes    Describe your medication adherence over the last 4 weeks: Very Good  How many doses have you missed in the last 4 weeks, if any? 0  How confident are you to follow the injection process and the treatment plan? (0-10) 10 Who injects? self  Does the patient have a current infection of any kind? No  Last refill of abortive medication: 3/28/2022  Number of refills on abortive medication in last 3 months: 1    Contraindications to therapy present? No    Drug Interactions:  No clinically significant interactions identified via BitLit Interaction Analysis as category D or higher.  _____________________________________________________________________  Renal Dosing:  Creatinine Clearance: Estimated Creatinine Clearance: 141 mL/min (A) (based on SCr of 0.5 mg/dL (L)). No renal adjustments necessary.     LABS  Lab Results   Component Value Date    BUN 11 04/06/2022    CREATININE 0.5 04/06/2022     Lab Results   Component Value Date    WBC 5.1 03/04/2022    HGB 12.1 03/04/2022    HCT 37.0 03/04/2022    RBC 3.90 03/04/2022     03/04/2022     Lab Results   Component Value Date    ALKPHOS 66 04/06/2022    ALT 17 04/06/2022    AST 20 04/06/2022    BILITOT 0.3 04/06/2022       IMMUNIZATIONS  Immunization History   Administered Date(s) Administered    COVID-19, Moderna, Primary or Immunocompromised, PF, 100mcg/0.5mL 01/11/2021, 02/08/2021    Influenza Virus Vaccine 09/01/2013, 10/15/2015, 10/06/2016, 10/19/2017, 10/01/2018, 10/29/2021, 10/29/2021    Influenza, High Dose (Fluzone 65 yrs and older) 10/15/2015    Influenza, Quadv, IM, PF (6 mo and older Fluzone, Flulaval, Fluarix, and 3 yrs and older Afluria) 10/10/2019    Influenza, Trivalent, Recombinant, Injectable vaccine, PF 10/06/2016    Pneumococcal Polysaccharide ([id number]) 10/26/2011, 10/26/2011, 09/01/2012    Tdap (Boostrix, Adacel) 03/01/2015, 03/01/2015, 03/22/2015      Immunization status: up to date and documented, missing doses of COVID. ASSESSMENTS  Fall Risk 5/18/2022   2 or more falls in past year? no   Fall with injury in past year? no     PROMIS V1.1 Global Health 5/18/2022   In general, would you say your health is: 4   In general, would you say your quality of life is: 4   In general, how would you rate your physical health? 4   In general, how would you rate your mental health, including your mood and your ability to think? 4   In general, how would you rate your satisfaction with your social activities and relationships? 4   In general, please rate how well you carry out your usual social activities and roles. (This includes activities at home, at work and in your community, and responsibilities as a parent, child, spouse, employee, friend, etc.) 3   To what extent are you able to carry out your everyday physical activities such as walking, climbing stairs, carrying groceries, or moving a chair? 4   In the past 7 days how often have you been bothered by emotional problems such as feeling anxious, depressed or irritable? 2   In the past 7 days how would you rate your fatigue on average? 3   In the past 7 days how would you rate your pain on average? 3   Mode of Collection 1   Person Completing Survey 0   PROMIS Physical Score 14   PROMIS Mental Score 14       1. Psoriatic Arthritis   Sharon Titus is a 50 y.o. female being treated for Psoriatic Arthritis.  Medication reconciliation completed, no drug-drug interactions identified. Allergy and diagnosis info reviewed and updated. Sharon Titus has a history remarkable for the following conditions: HTN, DM1, hypothyroidism, migraines, PsA, kidney stones   The patient has previously been treated with methotrexate (document treatment failure if applicable).  Current therapy includes: Cosentyx   Warnings, precautions, and contraindications reviewed with the patient. Also reviewed storage, administration, and proper disposal.   Current disease state symptoms include: joint pain and swelling   Medication Effectiveness: Patient disease is  well controlled on current therapy.  Reviewed potential side effects/ADEs. No side effects/adverse events reported, and no adherence issues identified.  Functional and cognitive limitations include: none   Reviewed copay and advised patient that they will receive a copy of the patient rights and responsibility document with their welcome packet in the mail.  Patient is not considered high risk. Based on patient feedback/results of the assessment, the therapy is  still appropriate.  No medication-related problem(s) or patient need(s) identified that would require a care plan. Follow up in 90 days     2. Migraine Headache   Margo Alvares is a 50 y.o. female being treated for Migraine.  Medication reconciliation completed, no drug-drug interactions identified. Allergy and diagnosis info reviewed and updated. Margo Alvares has a history remarkable for the following conditions: HTN, DM1, hypothyroidism, migraines, PsA, kidney stones   The patient has previously been treated with topiramate, metoprolol Current therapy includes: Aimovig + Maxalt   Warnings, precautions, and contraindications reviewed with the patient. Also reviewed storage, administration, and proper disposal.   Current disease state symptoms include: 3-4 migraines per month   Medication Effectiveness: Patient disease is  well controlled on current therapy.  Reviewed potential side effects/ADEs. No side effects/adverse events reported, and no adherence issues identified.    Functional and cognitive limitations include: none   Reviewed copay and advised patient that they will receive a copy of the patient rights and responsibility document with their welcome packet in the mail.   Patient is not considered high risk. Based on patient feedback/results of the assessment, the therapy is  still appropriate.  No medication-related problem(s) or patient need(s) identified that would require a care plan. Follow up in 90 days     3. Immunizations   Immunization status: up to date and documented, missing doses of COVID booster. Patient is agreeable to getting recommended vaccines. .    4. Drug Interactions   None     PLAN  Goals of therapy, common side effects, medication storage, and administration reviewed with patient. continue Cosentyx 150 mg every 4 weeks as prescribed and Aimovig 70 mg every 30 days as prescribed   Recommended monitoring to complete: none at this time  Recommended vaccinations: COVID  Non pharmacotherapy recommendations: Get enough sleep; Reduce stress; Drink plenty of water; Avoid triggers; Regular physical exercise  Keep all scheduled appointments.      Nova Sotomayor, PharmD, [address] [place] [address]  Ambulatory Clinical Pharmacist   Specialty Medication Service  Phone: [phone]    For Pharmacy [id number] [place] [address] Road in place:  No   Recommendation Provided To: Provider: 1 via Note to Provider and Patient/Caregiver: 1 via Telephone   Intervention Detail: Adherence Monitorin, Refill(s) Provided and Vaccine Recommended/Administered    Intervention Accepted By: Provider: 1 and Patient/Caregiver: 1   Time Spent (min): 75

## 2022-04-08 ENCOUNTER — TELEPHONE (OUTPATIENT)
Dept: FAMILY MEDICINE CLINIC | Age: 47
End: 2022-04-08

## 2022-04-08 NOTE — TELEPHONE ENCOUNTER
Patient called and stated that the short term disability forms that were sent to [facility] [place][note 2] 3-29-22  there is no diagnosis on the form please fix the form and refax to [phone] . ASAP so patient can get paid.

## 2022-04-09 LAB
QUANTI TB1 MINUS NIL: 0 IU/ML (ref 0–0.34)
QUANTI TB2 MINUS NIL: 0 IU/ML (ref 0–0.34)
QUANTIFERON (R) TB GOLD (INCUBATED): NEGATIVE IU/ML
QUANTIFERON MITOGEN MINUS NIL: 9.47 IU/ML
QUANTIFERON NIL: 0.02 IU/ML

## 2022-04-10 LAB — PARATHYROID HORMONE INTACT: 43 PG/ML (ref 15–65)

## 2022-04-11 ENCOUNTER — TELEPHONE (OUTPATIENT)
Dept: FAMILY MEDICINE CLINIC | Age: 47
End: 2022-04-11

## 2022-04-12 DIAGNOSIS — L30.4 INTERTRIGO: Primary | ICD-10-CM

## 2022-04-12 DIAGNOSIS — B37.31 VAGINAL CANDIDIASIS: ICD-10-CM

## 2022-04-12 RX ORDER — KETOCONAZOLE 20 MG/G
CREAM TOPICAL
Qty: 30 G | Refills: 0 | Status: SHIPPED | OUTPATIENT
Start: 2022-04-12

## 2022-04-12 RX ORDER — FLUCONAZOLE 150 MG/1
TABLET ORAL
Qty: 2 TABLET | Refills: 1 | Status: SHIPPED | OUTPATIENT
Start: 2022-04-12

## 2022-04-12 RX ORDER — NYSTATIN 100000 [USP'U]/G
POWDER TOPICAL
Qty: 30 G | Refills: 1 | Status: SHIPPED | OUTPATIENT
Start: 2022-04-12

## 2022-04-12 NOTE — TELEPHONE ENCOUNTER
I filled out these forms for the patient, but they require the signature of an [address], so I left them on Dr. Veronica Johnson with a note. Please check with him to make sure they get signed and sent ASAP. This patient has called and messaged us MULTIPLE times about these forms. Thanks!

## 2022-04-15 NOTE — RESULT ENCOUNTER NOTE
Madai Calvo, thanks for getting your blood work done. Blood count, iron levels, and thyroid levels look fine. Unfortunately A1c is 7.8%, which is above the goal of 6.9% or less. Please let Dr. Ruthie Nicolas know and follow his instructions for adjustment of your regimen.

## 2022-04-24 LAB — VITAMIN B-12: 256.5 PG/ML (ref 211–911)

## 2022-04-25 ENCOUNTER — OFFICE VISIT (OUTPATIENT)
Dept: FAMILY MEDICINE CLINIC | Age: 47
End: 2022-04-25
Payer: COMMERCIAL

## 2022-04-25 VITALS
HEIGHT: 63 IN | OXYGEN SATURATION: 96 % | DIASTOLIC BLOOD PRESSURE: 66 MMHG | WEIGHT: 188.7 LBS | HEART RATE: 113 BPM | BODY MASS INDEX: 33.43 KG/M2 | SYSTOLIC BLOOD PRESSURE: 106 MMHG

## 2022-04-25 DIAGNOSIS — K21.9 GASTROESOPHAGEAL REFLUX DISEASE, UNSPECIFIED WHETHER ESOPHAGITIS PRESENT: ICD-10-CM

## 2022-04-25 DIAGNOSIS — B96.89 ACUTE BACTERIAL SINUSITIS: ICD-10-CM

## 2022-04-25 DIAGNOSIS — E78.5 HYPERLIPIDEMIA, UNSPECIFIED HYPERLIPIDEMIA TYPE: ICD-10-CM

## 2022-04-25 DIAGNOSIS — E03.9 HYPOTHYROIDISM, UNSPECIFIED TYPE: ICD-10-CM

## 2022-04-25 DIAGNOSIS — F32.A ANXIETY AND DEPRESSION: ICD-10-CM

## 2022-04-25 DIAGNOSIS — G43.909 MIGRAINE WITHOUT STATUS MIGRAINOSUS, NOT INTRACTABLE, UNSPECIFIED MIGRAINE TYPE: Primary | ICD-10-CM

## 2022-04-25 DIAGNOSIS — F41.9 ANXIETY AND DEPRESSION: ICD-10-CM

## 2022-04-25 DIAGNOSIS — J01.90 ACUTE BACTERIAL SINUSITIS: ICD-10-CM

## 2022-04-25 PROCEDURE — 99214 OFFICE O/P EST MOD 30 MIN: CPT | Performed by: PHYSICIAN ASSISTANT

## 2022-04-25 RX ORDER — LEVOTHYROXINE SODIUM 0.15 MG/1
150 TABLET ORAL DAILY
Qty: 90 TABLET | Refills: 1 | Status: SHIPPED | OUTPATIENT
Start: 2022-04-25 | End: 2022-10-22

## 2022-04-25 RX ORDER — SIMVASTATIN 20 MG
20 TABLET ORAL NIGHTLY
Qty: 90 TABLET | Refills: 1 | Status: SHIPPED | OUTPATIENT
Start: 2022-04-25

## 2022-04-25 RX ORDER — OMEPRAZOLE 40 MG/1
40 CAPSULE, DELAYED RELEASE ORAL NIGHTLY
Qty: 90 CAPSULE | Refills: 1 | Status: SHIPPED | OUTPATIENT
Start: 2022-04-25 | End: 2022-10-22

## 2022-04-25 RX ORDER — DOXYCYCLINE HYCLATE 100 MG
100 TABLET ORAL 2 TIMES DAILY
Qty: 14 TABLET | Refills: 0 | Status: SHIPPED | OUTPATIENT
Start: 2022-04-25 | End: 2022-05-02

## 2022-04-25 RX ORDER — SERTRALINE HYDROCHLORIDE 100 MG/1
150 TABLET, FILM COATED ORAL DAILY
Qty: 135 TABLET | Refills: 1 | Status: SHIPPED | OUTPATIENT
Start: 2022-04-25

## 2022-04-25 NOTE — PROGRESS NOTES
4/25/2022    Anatoliy Hunt    Chief Complaint   Patient presents with    1 Month Follow-Up    Sinus Problem     had a sinus infection, was given amox, still having drainage, on going for about 3 weeks. HPI  History obtained from the patient. Aleisha Hanson is a 50 y.o. female who presents today for 4-week follow-up on migraines. Migraines - Patient started Aimovig monthly injections. She has only had one injection so far, a few weeks ago. \"I've had a couple of migraines since then, but I'm also moving, and stress is a trigger. \"     Sinusitus - Patient complains of sinus congestion X 3 weeks. She called telehealth and got a 7 day course of amoxicillin, but unfortunately even after completing this, \"It's just not going away. \"     Type 1 diabetes - Patient's last A1c on 4/6/2022 was 7.8%. Follows with [facility]. REVIEW OF SYMPTOMS  Review of Systems   Constitutional: Negative for chills and fever. Respiratory: Negative for cough and shortness of breath. Gastrointestinal: Negative for diarrhea and vomiting. PAST MEDICAL HISTORY  Past Medical History:   Diagnosis Date    Depression     Diabetes mellitus ([facility] [address]     GERD (gastroesophageal reflux disease)     History of stress test 03/28/2022    Normal EF 57 % Normal tracer uptake in all segments of myocardium on stress and rest images    Hx of echocardiogram 03/28/2022    EF55-60%. Mild left ventricular hypertrophy.  Mild tricuspid regurgitation;    Hypertension     PONV (postoperative nausea and vomiting)     Thyroid disease        FAMILY HISTORY  Family History   Problem Relation Age of Onset    Hypertension Mother     High Cholesterol Mother     Arrhythmia Mother        SOCIAL HISTORY  Social History     Socioeconomic History    Marital status: Single     Spouse name: Not on file    Number of children: Not on file    Years of education: Not on file    Highest education level: Not on file   Occupational History    Not on file   Tobacco Use  Smoking status: Never Smoker    Smokeless tobacco: Never Used   Vaping Use    Vaping Use: Never used   Substance and Sexual Activity    Alcohol use: Not Currently     Comment: occ/caffeine 1 coffee a day    Drug use: Never    Sexual activity: Yes     Partners: Male   Other Topics Concern    Not on file   Social History Narrative    Not on file     Social Determinants of Health     Financial Resource Strain:     Difficulty of Paying Living Expenses: Not on file   Food Insecurity:     Worried About Running Out of Food in the Last Year: Not on file    Nena of Food in the Last Year: Not on file   Transportation Needs:     Lack of Transportation (Medical): Not on file    Lack of Transportation (Non-Medical):  Not on file   Physical Activity:     Days of Exercise per Week: Not on file    Minutes of Exercise per Session: Not on file   Stress:     Feeling of Stress : Not on file   Social Connections:     Frequency of Communication with Friends and Family: Not on file    Frequency of Social Gatherings with Friends and Family: Not on file    Attends Episcopal Services: Not on file    Active Member of [address] [place] [address] or Organizations: Not on file    Attends Club or Organization Meetings: Not on file    Marital Status: Not on file   Intimate Partner Violence:     Fear of Current or Ex-Partner: Not on file    Emotionally Abused: Not on file    Physically Abused: Not on file    Sexually Abused: Not on file   Housing Stability:     Unable to Pay for Housing in the Last Year: Not on file    Number of [place] in the Last Year: Not on file    Unstable Housing in the Last Year: Not on file        SURGICAL HISTORY  Past Surgical History:   Procedure Laterality Date   Guy Kee 81 Left 1/7/2022    Crow Leblanc 42 performed by Sarahy Gunn MD at [address] [place] [address] Left 12/28/2021    CYSTOSCOPY LEFT RETROGRADE PYLEOGRAMS Stent placement performed by Patria Liang MD at [address] [facility] [address]  2014    HYSTERECTOMY, TOTAL ABDOMINAL  2017    KIDNEY STONE SURGERY  2017    LITHOTRIPSY Left 1/7/2022    LEFT CYSTOSCOPY URETEROSCOPY RETROGRADE PYELOGRAM STONE MANIPULATION WITH HOLIUM LASER LITHOTRIPSY performed by Tamara Farris MD at [facility]  2017   [facility]       CURRENT MEDICATIONS  Current Outpatient Medications   Medication Sig Dispense Refill    simvastatin (ZOCOR) 20 MG tablet Take 1 tablet by mouth nightly 90 tablet 1    levothyroxine (SYNTHROID) 150 MCG tablet Take 1 tablet by mouth Daily 90 tablet 1    omeprazole (PRILOSEC) 40 MG delayed release capsule Take 1 capsule by mouth nightly 90 capsule 1    sertraline (ZOLOFT) 100 MG tablet Take 1.5 tablets by mouth daily 135 tablet 1    doxycycline hyclate (VIBRA-TABS) 100 MG tablet Take 1 tablet by mouth 2 times daily for 7 days 14 tablet 0    ketoconazole (NIZORAL) 2 % cream Apply topically once daily to cover the affected and immediate surrounding area for 2 weeks. 30 g 0    fluconazole (DIFLUCAN) 150 MG tablet Take 1 tablet my mouth. If symptoms do not resolve, take a second tablet 72 hours later. 2 tablet 1    nystatin (MYCOSTATIN) 018115 UNIT/GM powder Apply 3 times daily.  30 g 1    lisinopril (PRINIVIL;ZESTRIL) 5 MG tablet Take 1 tablet by mouth daily 90 tablet 3    metoprolol succinate (TOPROL XL) 25 MG extended release tablet Take 1 tablet by mouth daily 90 tablet 3    Erenumab-aooe (AIMOVIG) 70 MG/ML SOAJ Inject 70 mg into the skin every 30 days 1 pen 5    secukinumab (COSENTYX) 150 MG/ML SOAJ Inject 150 mg into the skin once Once weekly for 5 weeks then once monthly      rizatriptan (MAXALT) 10 MG tablet Take 1 tablet by mouth as needed for Migraine 10 tablet 2    acetaminophen (TYLENOL) 325 MG tablet Take 650 mg by mouth every 4 hours as needed     [employer] Vinegar 500 MG TABS       B Complex-Biotin-FA (SUPER B-COMPLEX PO)       Biotin 1000 MCG TABS       Glucagon 1 MG/0.2ML SOSY Inject 1 Syringe into the skin as needed      insulin lispro (HUMALOG) 100 UNIT/ML injection vial USE UP TO 75 UNITS DAILY      Accu-Chek FastClix Lancets MISC       Multiple Vitamin (MULTIVITAMIN) capsule Take 1 capsule by mouth nightly      famotidine (PEPCID) 20 MG tablet Take 1 tablet by mouth daily 90 tablet 1    Cyanocobalamin (VITAMIN B12 PO) Take 1 tablet by mouth daily      CALCIUM PO Take 1 tablet by mouth daily      Cholecalciferol (VITAMIN D-3 PO) Take 1 tablet by mouth daily      Semaglutide, 1 MG/DOSE, 2 MG/1.5ML SOPN Inject 1 mg into the skin once a week Takes on Tuesdays      pregabalin (LYRICA) 150 MG capsule Take 300 mg by mouth 2 times daily.  aspirin 81 MG EC tablet Take 81 mg by mouth daily      Insulin Pump - Insulin regular Inject into the skin continuous Insulin-to-Carb Ratio (ICR): Insulin Sensitivity Factor (ISF):   Target Blood Glucose: Bolus Frequency:   Humalog    Insulin pump with CGM       No current facility-administered medications for this visit.        ALLERGIES  Allergies   Allergen Reactions    Dilaudid [Hydromorphone Hcl] Rash    Metformin And Related Nausea And Vomiting       RECENT LABS    Lab Results   Component Value Date    LABA1C 7.8 (H) 04/06/2022     Lab Results   Component Value Date     04/06/2022       Lab Results   Component Value Date    CHOL 155 06/24/2021     Lab Results   Component Value Date    LDLCALC 70 06/24/2021       Lab Results   Component Value Date    WBC 5.1 03/04/2022    HGB 12.1 03/04/2022    HCT 37.0 03/04/2022    MCV 94.9 03/04/2022     03/04/2022       PHYSICAL EXAM  /66 (Site: Right Upper Arm, Position: Sitting, Cuff Size: Medium Adult)   Pulse 113   Ht 5' 3\" (1.6 m)   Wt 188 lb 11.2 oz (85.6 kg)   SpO2 96%   BMI 33.43 kg/m²     PHYSICAL EXAMINATION:    Constitutional: Appears well-developed and well-nourished. No apparent distress    Mental status: Alert and awake, Oriented to person/place/time, Able to follow commands   Eyes: EOM normal, sclera normal, no visible discharge. HENT: Normocephalic, atraumatic. Mouth/Throat: Mucous membranes are moist. External Ears Normal   Neck: No visualized mass   Pulmonary/Chest: Respiratory effort normal. No visualized signs of difficulty breathing or respiratory distress   Musculoskeletal: Normal gait with no signs of ataxia. Normal range of motion of neck  Neurological:No Facial Asymmetry (Cranial nerve 7 motor function). No gaze palsy. Skin: No significant exanthematous lesions or discoloration noted on facial skin  Psychiatric: Normal Affect. No Hallucinations. ASSESSMENT & PLAN  1. Migraine without status migrainosus, not intractable, unspecified migraine type  Improving on Aimovig. Will follow up again in 8 weeks. Would like to see less than 4 migraines per month. 2. Acute bacterial sinusitis  Prescribed Doxycyline.   - doxycycline hyclate (VIBRA-TABS) 100 MG tablet; Take 1 tablet by mouth 2 times daily for 7 days  Dispense: 14 tablet; Refill: 0    3. Gastroesophageal reflux disease, unspecified whether esophagitis present  Refilled medication. - omeprazole (PRILOSEC) 40 MG delayed release capsule; Take 1 capsule by mouth nightly  Dispense: 90 capsule; Refill: 1    4. Hypothyroidism, unspecified type  Refilled medication.   - levothyroxine (SYNTHROID) 150 MCG tablet; Take 1 tablet by mouth Daily  Dispense: 90 tablet; Refill: 1    5. Hyperlipidemia, unspecified hyperlipidemia type  Refilled medication. - simvastatin (ZOCOR) 20 MG tablet; Take 1 tablet by mouth nightly  Dispense: 90 tablet; Refill: 1    6. Anxiety and depression  Refilled medication. - sertraline (ZOLOFT) 100 MG tablet; Take 1.5 tablets by mouth daily  Dispense: 135 tablet; Refill: 1        Return in about 8 weeks (around 6/20/2022).             Electronically signed by Zamzam Sahu PA-C on 4/25/2022

## 2022-05-18 RX ORDER — PREGABALIN 300 MG/1
300 CAPSULE ORAL 2 TIMES DAILY
COMMUNITY
Start: 2022-04-25

## 2022-05-18 ASSESSMENT — PROMIS GLOBAL HEALTH SCALE
IN GENERAL, WOULD YOU SAY YOUR HEALTH IS...[ON A SCALE OF 1 (POOR) TO 5 (EXCELLENT)]: 4
IN GENERAL, HOW WOULD YOU RATE YOUR SATISFACTION WITH YOUR SOCIAL ACTIVITIES AND RELATIONSHIPS [ON A SCALE OF 1 (POOR) TO 5 (EXCELLENT)]?: 4
IN THE PAST 7 DAYS, HOW WOULD YOU RATE YOUR PAIN ON AVERAGE [ON A SCALE FROM 0 (NO PAIN) TO 10 (WORST IMAGINABLE PAIN)]?: 3
IN THE PAST 7 DAYS, HOW WOULD YOU RATE YOUR FATIGUE ON AVERAGE [ON A SCALE FROM 1 (NONE) TO 5 (VERY SEVERE)]?: 3
IN GENERAL, PLEASE RATE HOW WELL YOU CARRY OUT YOUR USUAL SOCIAL ACTIVITIES (INCLUDES ACTIVITIES AT HOME, AT WORK, AND IN YOUR COMMUNITY, AND RESPONSIBILITIES AS A PARENT, CHILD, SPOUSE, EMPLOYEE, FRIEND, ETC) [ON A SCALE OF 1 (POOR) TO 5 (EXCELLENT)]?: 3
IN THE PAST 7 DAYS, HOW OFTEN HAVE YOU BEEN BOTHERED BY EMOTIONAL PROBLEMS, SUCH AS FEELING ANXIOUS, DEPRESSED, OR IRRITABLE [ON A SCALE FROM 1 (NEVER) TO 5 (ALWAYS)]?: 2
SUM OF RESPONSES TO QUESTIONS 2, 4, 5, & 10: 14
TO WHAT EXTENT ARE YOU ABLE TO CARRY OUT YOUR EVERYDAY PHYSICAL ACTIVITIES SUCH AS WALKING, CLIMBING STAIRS, CARRYING GROCERIES, OR MOVING A CHAIR [ON A SCALE OF 1 (NOT AT ALL) TO 5 (COMPLETELY)]?: 4
IN GENERAL, HOW WOULD YOU RATE YOUR PHYSICAL HEALTH [ON A SCALE OF 1 (POOR) TO 5 (EXCELLENT)]?: 4
HOW IS THE PROMIS V1.1 BEING ADMINISTERED?: 1
SUM OF RESPONSES TO QUESTIONS 3, 6, 7, & 8: 14
IN GENERAL, WOULD YOU SAY YOUR QUALITY OF LIFE IS...[ON A SCALE OF 1 (POOR) TO 5 (EXCELLENT)]: 4
IN GENERAL, HOW WOULD YOU RATE YOUR MENTAL HEALTH, INCLUDING YOUR MOOD AND YOUR ABILITY TO THINK [ON A SCALE OF 1 (POOR) TO 5 (EXCELLENT)]?: 4
WHO IS THE PERSON COMPLETING THE PROMIS V1.1 SURVEY?: 0

## 2022-05-23 ENCOUNTER — PHARMACY VISIT (OUTPATIENT)
Dept: INTERNAL MEDICINE | Age: 47
End: 2022-05-23

## 2022-05-23 DIAGNOSIS — L40.50 PSORIATIC ARTHRITIS (HCC): Primary | ICD-10-CM

## 2022-05-23 DIAGNOSIS — G43.909 MIGRAINE WITHOUT STATUS MIGRAINOSUS, NOT INTRACTABLE, UNSPECIFIED MIGRAINE TYPE: ICD-10-CM

## 2022-05-23 PROCEDURE — 99999 PR OFFICE/OUTPT VISIT,PROCEDURE ONLY: CPT | Performed by: INTERNAL MEDICINE

## 2022-05-23 PROCEDURE — 1111F DSCHRG MED/CURRENT MED MERGE: CPT | Performed by: INTERNAL MEDICINE

## 2022-05-23 RX ORDER — ERENUMAB-AOOE 70 MG/ML
70 INJECTION SUBCUTANEOUS
Qty: 1 PEN | Refills: 5 | Status: SHIPPED | OUTPATIENT
Start: 2022-05-23 | End: 2022-08-21

## 2022-05-23 NOTE — PROGRESS NOTES
Initial Specialty Medication Virtual Visit  [facility]YSIC[facility]  [facility] [address]  [place] [address]  Dept: [phone]  Dept Fax: [phone]  Date of patient's visit: 5/23/2022  Patient's Name:  Gabby Joyner YOB: 1975            Patient Care Team:  Rene Patton PA-C as PCP - General (Physician Assistant)  Rene Patton PA-C as PCP - [facility]  ================================================================    REASON FOR VISIT/CHIEF COMPLAINT:  Medication Management (New patient Cosentyx and Aimovig)    HISTORY OF PRESENTING ILLNESS:  Gabby Joyner is 50 y.o. is here for initial virtual visit for specialty medication.     Specialty Medication: COSENTYX 300 MG DOSE-2 PENS   Frequency: Every 28 days  Indication: Psoriatic Arthritis  Initially Diagnosed: unknown  Additional Therapy:   · none  Previous Therapy:   · Methotrexate     Specialist:   [address]   [phone]     Specialist Progress Note Available: No  Last Specialist Visit: This morning     Specialty Medication: AIMOVIG 70 MG/ML AUTOINJECTOR   Frequency: Every 30 days  Indication: Migraines  Initially Diagnosed: unknown  Additional Therapy:   · Maxalt  Previous Therapy:   · Topiramate  · Metoprolol     Specialist:   Rene Patton      DIAGNOSTIC FINDINGS:  CBC:  Lab Results   Component Value Date    WBC 5.1 03/04/2022    HGB 12.1 03/04/2022     03/04/2022       BMP:    Lab Results   Component Value Date     04/06/2022    K 4.4 04/06/2022     04/06/2022    CO2 27 04/06/2022    BUN 11 04/06/2022    CREATININE 0.5 04/06/2022    GLUCOSE 91 04/06/2022       HEMOGLOBIN A1C:   Lab Results   Component Value Date    LABA1C 7.8 04/06/2022       FASTING LIPID PANEL:  Lab Results   Component Value Date    CHOL 155 06/24/2021    HDL 67 06/24/2021    TRIG 92 06/24/2021       No results found for: TEODORO    Lab Results   Component Value Date    HEPCAB NON REACTIVE 04/06/2022           Patient Active Problem List   Diagnosis    Pyelonephritis    Renal calculi    Diabetic ketoacidosis without coma associated with type 1 diabetes mellitus ([facility] [address]    Tachycardia    Hypothyroidism    Achalasia    Uncontrolled type 1 diabetes mellitus with hyperglycemia (HCC)    Intractable migraine with aura with status migrainosus    Neuropathy    Status post gastric bypass for obesity    Type 1 diabetes mellitus without complication (HCC)    History of iron deficiency    Migraine without status migrainosus, not intractable    Primary hypertension    History of kidney stones    Psoriatic arthritis ([facility] [address]    Syncope and collapse       Health Maintenance Due   Topic Date Due    Diabetic foot exam  Never done    Diabetic microalbuminuria test  Never done    Diabetic retinal exam  Never done    Hepatitis B vaccine (1 of 3 - Risk 3-dose series) Never done    Pneumococcal 0-64 years Vaccine (2 - PCV) 09/01/2013    COVID-19 Vaccine (3 - Booster for Moderna series) 07/08/2021       Allergies   Allergen Reactions    Dilaudid [Hydromorphone Hcl] Rash    Metformin And Related Nausea And Vomiting         Current Outpatient Medications   Medication Sig Dispense Refill    Erenumab-aooe (AIMOVIG) 70 MG/ML SOAJ Inject 70 mg into the skin every 30 days 1 pen 5    secukinumab (COSENTYX) 150 MG/ML SOAJ Inject 1 mL into the skin every 28 days 1 mL 2    pregabalin (LYRICA) 300 MG capsule Take 300 mg by mouth in the morning and at bedtime.       simvastatin (ZOCOR) 20 MG tablet Take 1 tablet by mouth nightly 90 tablet 1    levothyroxine (SYNTHROID) 150 MCG tablet Take 1 tablet by mouth Daily 90 tablet 1    omeprazole (PRILOSEC) 40 MG delayed release capsule Take 1 capsule by mouth nightly 90 capsule 1    sertraline (ZOLOFT) 100 MG tablet Take 1.5 tablets by mouth daily 135 tablet 1    ketoconazole (NIZORAL) 2 % cream Apply topically once daily to cover the affected and immediate surrounding area for 2 weeks. 30 g 0    fluconazole (DIFLUCAN) 150 MG tablet Take 1 tablet my mouth. If symptoms do not resolve, take a second tablet 72 hours later. 2 tablet 1    nystatin (MYCOSTATIN) 637187 UNIT/GM powder Apply 3 times daily. 30 g 1    lisinopril (PRINIVIL;ZESTRIL) 5 MG tablet Take 1 tablet by mouth daily 90 tablet 3    metoprolol succinate (TOPROL XL) 25 MG extended release tablet Take 1 tablet by mouth daily 90 tablet 3    rizatriptan (MAXALT) 10 MG tablet Take 1 tablet by mouth as needed for Migraine 10 tablet 2    acetaminophen (TYLENOL) 325 MG tablet Take 650 mg by mouth every 4 hours as needed      [employer] Vinegar 500 MG TABS Take 500 mg by mouth daily       B Complex-Biotin-FA (SUPER B-COMPLEX PO) Take 1 tablet by mouth daily       Biotin 1000 MCG TABS Take 1,000 mcg by mouth daily       Glucagon 1 MG/0.2ML SOSY Inject 1 Syringe into the skin as needed      insulin lispro (HUMALOG) 100 UNIT/ML injection vial USE UP TO 75 UNITS DAILY      Accu-Chek FastClix Lancets MISC       Multiple Vitamin (MULTIVITAMIN) capsule Take 1 capsule by mouth nightly      famotidine (PEPCID) 20 MG tablet Take 1 tablet by mouth daily 90 tablet 1    Cyanocobalamin (VITAMIN B12 PO) Take 1 tablet by mouth daily      CALCIUM PO Take 1 tablet by mouth daily      Cholecalciferol (VITAMIN D-3 PO) Take 1 tablet by mouth daily      Semaglutide, 1 MG/DOSE, 2 MG/1.5ML SOPN Inject 1 mg into the skin once a week Takes on Tuesdays      aspirin 81 MG EC tablet Take 81 mg by mouth daily      Insulin Pump - Insulin regular Inject into the skin continuous Insulin-to-Carb Ratio (ICR): Insulin Sensitivity Factor (ISF):   Target Blood Glucose: Bolus Frequency:   Humalog    Insulin pump with CGM       No current facility-administered medications for this visit.        Social History     Tobacco Use    Smoking status: Never Smoker    Smokeless tobacco: Never Used   Vaping Use    Vaping Use: Never used   Substance Use Topics    Alcohol use: Not Currently     Comment: occ/caffeine 1 coffee a day    Drug use: Never       Family History   Problem Relation Age of Onset    Hypertension Mother     High Cholesterol Mother     Arrhythmia Mother         REVIEW OF SYSTEMS:  Review of Systems    PHYSICAL EXAM:  There were no vitals filed for this visit. BP Readings from Last 3 Encounters:   04/25/22 106/66   04/06/22 122/78   03/28/22 124/84          ASSESSMENT AND PLAN:  Martha Vargas was seen today for medication management. Diagnoses and all orders for this visit:    Psoriatic arthritis ([facility] [address]  -     secukinumab (COSENTYX) 150 MG/ML SOAJ; Inject 1 mL into the skin every 28 days  -     [facility]) Specialty Medication Service    Migraine without status migrainosus, not intractable, unspecified migraine type  -     Erenumab-aooe (AIMOVIG) 70 MG/ML SOAJ; Inject 70 mg into the skin every 30 days  -     [facility]) Specialty Medication Service      FOLLOW UP AND INSTRUCTIONS:  · Follow up with 6 months    · Martha Vargas received counseling on the following healthy behaviors: nutrition, exercise and medication adherence    · Discussed use, benefit, and side effects of prescribed medications. Barriers to medication compliance addressed. All patient questions answered. Pt voiced understanding.       Adarsh Mandujano  Director [facility] program  Faculty Internal Medicine    [facility]  [address] [place] [address]    5/23/2022, 11:05 AM

## 2022-05-26 NOTE — PROGRESS NOTES
OV summaries for visits 3/22/2022 and 5/22/2022 scanned in Media tab.     Yovany Perez, PharmD, [address]  Ambulatory Clinical Pharmacist   Specialty Medication Service  Phone: [phone]

## 2022-09-20 ENCOUNTER — TELEPHONE (OUTPATIENT)
Dept: FAMILY MEDICINE CLINIC | Age: 47
End: 2022-09-20

## 2022-09-23 NOTE — TELEPHONE ENCOUNTER
Received fax from [address] re prior auth request for Qi Hendricks. \" The patient does not have active eligibilty. Please verify the patient's current health plan coverage. \"    Left message for pt to return call and verify insurance coverage.

## 2024-01-26 NOTE — PROGRESS NOTES
1727  Pt received to Same Day from PACU per cart. Pt is awake and alert--skin pink, W&D. Report received from [facility]. VS rechecked and stable. Given soda and crackers per request.  Pt's daughter at beside. 1737  Medicated with po pain med as ordered. 1805  Pt states that she is ready to go home now. VS rechecked and remain stable. IV dc'd for her to go to bathroom then get dressed. 1815  Pt voided w/o difficulty but states urine was very bloody. Instructed to drink lots of fluids this evening at home. 1830  Pt instructed for discharge care and follow-up and use of Rx with understanding voiced. 1835  Pt to car at exit per wheelchair. [No] : not [NSR] : normal sinus rhythm [CRT-D] : Cardiac resynchronization therapy defibrillator [DDD] : DDD [Sensing Amplitude ___mv] : sensing amplitude was [unfilled] mv [Lead Imp:  ___ohms] : lead impedance was [unfilled] ohms [___V @] : [unfilled] V [___ ms] : [unfilled] ms [de-identified] : Cobalt  [de-identified] : [employer]  [de-identified] : 12/21/23 [de-identified] : AP 0.1, Effective CRT 97.1%.  [de-identified] : 50

## (undated) DEVICE — TRAY PREP DRY W/ PREM GLV 2 APPL 6 SPNG 2 UNDPD 1 OVERWRAP

## (undated) DEVICE — TOWEL,OR,DSP,ST,BLUE,STD,6/PK,12PK/CS: Brand: MEDLINE

## (undated) DEVICE — GLOVE SURG SZ 65 CRM LTX FREE POLYISOPRENE POLYMER BEAD ANTI

## (undated) DEVICE — UROLOGIC DRAIN BAG: Brand: UNBRANDED

## (undated) DEVICE — MARKER SURG SKIN UTIL BLK REG TIP NONSMEARING W/ 6IN RUL

## (undated) DEVICE — GOWN,ECLIPSE,POLYRNF,BRTHSLV,L,30/CS: Brand: MEDLINE

## (undated) DEVICE — GLOVE SURG SZ 65 L12IN FNGR THK79MIL GRN LTX FREE

## (undated) DEVICE — GUIDEWIRE ENDOSCP L150CM DIA0.035IN TIP 3CM PTFE NIT

## (undated) DEVICE — SINGLE-USE DIGITAL FLEXIBLE URETEROSCOPE: Brand: LITHOVUE

## (undated) DEVICE — SYRINGE MED 20ML STD CLR PLAS LUERLOCK TIP N CTRL DISP

## (undated) DEVICE — URETERAL ACCESS SHEATH SET: Brand: NAVIGATOR HD

## (undated) DEVICE — GOWN,SIRUS,POLYRNF,BRTHSLV,XLN/XL,20/CS: Brand: MEDLINE

## (undated) DEVICE — SET IRRIG L94IN ID0.281IN W/ 4.5IN DST FLX CONN 2 LD ON OFF

## (undated) DEVICE — GLOVE SURG SZ 6 CRM LTX FREE POLYISOPRENE POLYMER BEAD ANTI

## (undated) DEVICE — TOWEL OR BLUEE 16X26IN ST 8 PACK ORB08 16X26ORTWL

## (undated) DEVICE — GLOVE ORANGE PI 8   MSG9080

## (undated) DEVICE — PACK,CYSTOSCOPY,PK I,AURORA: Brand: MEDLINE